# Patient Record
Sex: FEMALE | Race: WHITE
[De-identification: names, ages, dates, MRNs, and addresses within clinical notes are randomized per-mention and may not be internally consistent; named-entity substitution may affect disease eponyms.]

---

## 2018-06-24 ENCOUNTER — HOSPITAL ENCOUNTER (INPATIENT)
Dept: HOSPITAL 68 - ERH | Age: 78
LOS: 3 days | DRG: 394 | End: 2018-06-27
Attending: INTERNAL MEDICINE | Admitting: INTERNAL MEDICINE
Payer: COMMERCIAL

## 2018-06-24 VITALS — DIASTOLIC BLOOD PRESSURE: 58 MMHG | SYSTOLIC BLOOD PRESSURE: 100 MMHG

## 2018-06-24 VITALS — SYSTOLIC BLOOD PRESSURE: 100 MMHG | DIASTOLIC BLOOD PRESSURE: 58 MMHG

## 2018-06-24 VITALS — BODY MASS INDEX: 24.83 KG/M2 | WEIGHT: 140.12 LBS | HEIGHT: 63 IN

## 2018-06-24 DIAGNOSIS — E87.6: ICD-10-CM

## 2018-06-24 DIAGNOSIS — R91.8: ICD-10-CM

## 2018-06-24 DIAGNOSIS — Z66: ICD-10-CM

## 2018-06-24 DIAGNOSIS — F32.9: ICD-10-CM

## 2018-06-24 DIAGNOSIS — D72.1: ICD-10-CM

## 2018-06-24 DIAGNOSIS — E86.0: ICD-10-CM

## 2018-06-24 DIAGNOSIS — E87.2: ICD-10-CM

## 2018-06-24 DIAGNOSIS — K55.039: Primary | ICD-10-CM

## 2018-06-24 DIAGNOSIS — E78.5: ICD-10-CM

## 2018-06-24 DIAGNOSIS — D72.829: ICD-10-CM

## 2018-06-24 DIAGNOSIS — R19.7: ICD-10-CM

## 2018-06-24 LAB
ABSOLUTE GRANULOCYTE CT: 8.2 /CUMM (ref 1.4–6.5)
BASOPHILS # BLD: 0 /CUMM (ref 0–0.2)
BASOPHILS NFR BLD: 0.1 % (ref 0–2)
EOSINOPHIL # BLD: 0.9 /CUMM (ref 0–0.7)
EOSINOPHIL NFR BLD: 7.9 % (ref 0–5)
ERYTHROCYTE [DISTWIDTH] IN BLOOD BY AUTOMATED COUNT: 14 % (ref 11.5–14.5)
GRANULOCYTES NFR BLD: 73 % (ref 42.2–75.2)
HCT VFR BLD CALC: 40.6 % (ref 37–47)
LYMPHOCYTES # BLD: 1.6 /CUMM (ref 1.2–3.4)
MCH RBC QN AUTO: 29.3 PG (ref 27–31)
MCHC RBC AUTO-ENTMCNC: 33 G/DL (ref 33–37)
MCV RBC AUTO: 88.9 FL (ref 81–99)
MONOCYTES # BLD: 0.5 /CUMM (ref 0.1–0.6)
PLATELET # BLD: 632 /CUMM (ref 130–400)
PMV BLD AUTO: 7.4 FL (ref 7.4–10.4)
RED BLOOD CELL CT: 4.57 /CUMM (ref 4.2–5.4)
WBC # BLD AUTO: 11.2 /CUMM (ref 4.8–10.8)

## 2018-06-24 NOTE — HISTORY & PHYSICAL
Eber Maria MD 06/24/18 1700:
General Information and HPI
MD Statement:
I have seen and personally examined DI SMITH and documented this H&P.
 
The patient is a 77 year old F who presented with a patient stated chief 
complaint of nausea, vomiting, diarrhea, and abdominal pain.
 
Source of Information: patient, family
Exam Limitations: no limitations
History of Present Illness:
77 year old female with HLD, PUD s/p EGD tx'd with PPI, doesn't recall being 
treated with antibiotics for Hpylori, depression, former 30pack-year smoker quit
40 years ago, and a remote history of colitis (uncertain tx), with last 
colonoscopy and EGD 7 years ago, reportedly no polyps, evaluated by Dr. Yolis Schwab in [place] presented with complaints of abdominal pain, nausea, 
vomiting, and diarrhea.  The patient reports that she was in her usual state of 
health approximately three weeks ago and went to her annual visit with her PCP. 
She was found to be guiaic positive on DESIREE and had labs performed that were 
unremarkable.  Approximately one week later, she developed crampy lower 
abdominal pain and associated diarrhea (4-5 episodes per day).  She didn't have 
any fevers, chills, sick contacts, recent travel, or suspiscious ingestions.  
The patient was re-evaluated by her PCP after about a week and had more labs 
performed notable for leukocytosis.  She continued to have diarrhea, watery with
some blood notable on the toilet paper after wiping but not in the toilet or 
stool.  The patient reportedly normally suffers from constipation that she 
manages with dulcolax.  Her primary care then scheduled her to have CT scan in 
three days from now and prescribed cipro/flagyl.  She continued to worsen with 
persistent abdominal pain, stabbing and crampy 10/10, somewhat improved after 
bowel movements and vomiting.  She has had poor oral intake for the past several
days that has been worsening.  She started out with nausea and dry heaves, but 
now is wretching and unable to tolerate PO including her antibiotics, of which 
she only took one days worth.  On review of systems, she reports constipation, 
occasional headache managed with tylenol, and fatigue.  She denies any skin 
rashes, joint pains, weight loss, chest pain, dyspnea, presyncopal symptoms, or 
recent antibiotic treatment.  On presentation to the ED, she had a HR in the 90s
, mild leukocytosis, lactic acidemia and CT findings of colitis.  She was 
treated with normal saline, ceftriaxone, flagyl, zofran, and morphine, which 
improved her pain.  She is being admitted to general medicine for management of 
colitis with inability to tolerate oral intake including antibiotics.
 
Allergies/Medications
Allergies:
Coded Allergies:
No Known Allergies (06/24/18)
 
Compliance With Home Meds: GOOD
 
Past History
 
Travel History
Traveled to [place] past 21 day No
 
Medical History
Neurological: NONE
EENT: NONE
Cardiovascular: LOW BLOOD PRESSURE
Respiratory: NONE
Gastrointestinal: DIVERTICULITIS
Hepatic: NONE
Renal: NONE
Musculoskeletal: NONE
Psychiatric: NONE
Endocrine: NONE
 
Surgical History
Surgical History: appendectomy
 
Past Family/Social History
 
Family History
Relations & Conditions if any
MOTHER
  FH: ovarian cancer
SISTER
  FH: lung cancer
SISTER
  FH: breast cancer
aunt
  FH: pancreatic cancer
 
 
Psychosocial History
Smoking Status: Former Smoker
ETOH Use: denies use
Illicit Drug Use: denies illicit drug use
 
Functional Ability
ADLs
Independent: dressing, eating, toileting, bathing. 
Ambulation: independent
IADLs
Independent: shopping, housework, finances, food prep, telephone, transportation
, medication admin. 
 
Review of Systems
 
Review of Systems
Constitutional:
Reports: chills, malaise.  Denies: fever. 
EENTM:
Reports: no symptoms. 
Cardiovascular:
Denies: chest pain. 
Respiratory:
Denies: cough, short of breath. 
GI:
Reports: abdominal pain, bloating, constipation, diarrhea, nausea, bloody stool,
changes in stool, vomiting. 
Genitourinary:
Denies: discharge, dysuria. 
Musculoskeletal:
Denies: joint pain. 
Skin:
Denies: rash. 
Neurological/Psychological:
Reports: no symptoms. 
Hematologic/Endocrine:
Reports: no symptoms. 
Immunologic/Allergic:
Reports: no symptoms. 
All Other Systems: Reviewed and Negative
 
Exam & Diagnostic Data
Last 24 Hrs of Vital Signs/I&O
 Vital Signs
 
 
Date Time Temp Pulse Resp B/P B/P Pulse O2 O2 Flow FiO2
 
     Mean Ox Delivery Rate 
 
06/24 1446 97.2 96 16 108/70  98 Room Air  
 
06/24 1413      96 Room Air  
 
06/24 1347 97.3 98 16 115/64  98 Room Air  
 
 
 Intake & Output
 
 
 06/24 1600 06/24 0800 06/24 0000
 
Intake Total 0  
 
Output Total   
 
Balance 0  
 
    
 
Intake, Oral 0  
 
Patient 63.957 kg  
 
Weight   
 
 
 
 
Physical Exam
General Appearance Alert, Oriented X3, Cooperative, No Acute Distress
Skin No Rashes, No Breakdown
Skin Temp/Moisture Exam: Warm/Dry
Sepsis Skin Exam (color): Normal for Ethnicity
Cardiovascular Regular Rate, Normal S1, Normal S2, No Murmurs
Lungs Clear to Auscultation, Normal Air Movement
Abdomen Normal Bowel Sounds, Soft, mild distention, tenderness to palpation 
epigastric and RLQ, no guarding, no rebound, no rigidity
Extremities No Clubbing, No Cyanosis, No Edema, Normal Pulses
Sepsis Peripheral Pulse Location: Radial
Sepsis Peripheral Pulse Exam: Normal
Sepsis Cap Refill Exam: <2 Sec
Last 24 Hrs of Labs/John:
 Laboratory Tests
 
06/24/18 1715:
Lactic Acid 1.1
 
06/24/18 1430:
Anion Gap 12, Estimated GFR > 60, BUN/Creatinine Ratio 20.0, Glucose 144  H, 
Lactic Acid 2.8  H, Calcium 8.8, Iron Pending, TIBC Pending, Ferritin Pending, 
Total Bilirubin 0.5, AST 32, ALT 38, Alkaline Phosphatase 118, Troponin I < 0.01
, C-Reactive Prot, Quant Pending, Total Protein 5.8  L, Albumin 2.8  L, Globulin
3.0, Albumin/Globulin Ratio 0.9  L, Amylase 31, Lipase 23, CBC w Diff NO MAN 
DIFF REQ, RBC 4.57, MCV 88.9, MCH 29.3, MCHC 33.0, RDW 14.0, MPV 7.4, Gran % 
73.0, Lymphocytes % 14.5  L, Monocytes % 4.5, Eosinophils % 7.9  H, Basophils % 
0.1, Absolute Granulocytes 8.2  H, Absolute Lymphocytes 1.6, Absolute Monocytes 
0.5, Absolute Eosinophils 0.9, Absolute Basophils 0
 Microbiology
06/24 1810  STOOL: Stool Culture - ORD
 
 
 
Diagnostic Data
EKG Results
sinus rhythm without ischemic changes
Other Results
CT Abdomen/Pelvis with IV contrast
 
IMPRESSION:
- Diffuse colonic wall thickening with pericolonic inflammatory stranding and
small amount of ascites. The findings are concerning for
infectious/laboratory colitis. There is liquid stool within the distal colon.
No evidence of perforation.
- A nonspecific 2.7 cm lesion seen in the region of the gastric on this and
may represent a gastric diverticulum or alternatively could be related to the
adrenal gland. Attention to this lesion is recommended at the time of
follow-up imaging.
- Multiple pulmonary nodules at the lung bases the largest on the left
measuring up to 9 mm. Metastatic disease cannot be excluded.
- Fibroid uterus and left adnexal cyst.
 
Assessment/Plan
Assessment:
77 year old female with HLD, PUD s/p EGD tx'd with PPI, doesn't recall being 
treated with antibiotics for Hpylori, depression, former 30pack-year smoker quit
40 years ago, and a remote history of colitis (uncertain tx), with last 
colonoscopy and EGD 7 years ago, reportedly no polyps, evaluated by Dr. Yolis Schwab in [place] presented with complaints of abdominal pain, nausea, 
vomiting, and diarrhea.
 
Colitis:
 Most likely infectious colitis with acute onset, possibly ischemic with h/o HLD
and smoking, although blood appears more related to constipation and hemorrhoids
than than bloody diarrhea of ischemic colitis or ulcerative colitis, also 
unclear history of colitis in the past, 
 Check stool studies, culture, ova and parasites, C diff.
 Gastroenterology consultation
 Continue ceftriaxone and metronidazole intravenously
 Will need follow up colonoscopy
 Mild leukocytosis to 11.2 and thrombocytosis to 632, likely reactive, repeat 
CBC in AM
 Amylase, lipase, and liver function tests all normal
 Lactic acid elevated, likely secondary to diarrhea, poor PO, dehydration
 Trend after intravascular volume resuscitation
 Continue NS @ 75cc/hr x 2 bags
 Clear liquid diet for now will plan to advance as condition improves
 Obtain records from Dr. Yolis Basurto
 
CT
Diffuse thickening involving the distal transverse colon, descending colon, and 
sigmoid colon with areas of pericolonic inflammatory stranding. A small amount 
of ascites is seen for example in the right paracolic gutter and layering 
dependently within the pelvis. There is liquid stool within the sigmoid colon. 
The stomach appears normal. There is a possible gastric fundus diverticulum 
versus adrenal lesion which measures 2.7 cm (series 2 image 22). The appendix is
not well seen.
Numerous noncalcified pulmonary nodules, largest up to 9mm
Will need follow up for pulmonary nodules on CT
 
HLD:
 Continue pravastatin
 
Depression:
 Continue paxil
 
PO PPI, continue vitamin supplementation
Clear liquid diet
DVT ppx-heparin sc and ALPs
DNR/DNI
 
As Ranked By This Provider
Problem List:
 1. Bloody stool
 
 2. Gastric lesion
 
 3. Infectious colitis
 
 
Core Measures/Misc (9/17)
 
Acute Coronary Syndrome
ACS Diagnosis: No
 
Congestive Heart Failure
Congestive Heart Failure Diagnosis No
 
Cerebrovascular Accident
CVA/TIA Diagnosis: No
 
VTE (View Protocol)
VTE Risk Factors Age>40
No Mechanical VTE Prophylaxis d/t N/A MechProphylax Ordered
No VTE Pharm Prophylaxis d/t NA PharmProphylax ordered
 
Sepsis (View protocol)
Sepsis Present: No
If YES complete Sepsis Event Note If YES complete Sepsis Event Note
 
Tari Jones MD 06/24/18 1710:
General Information and HPI
 
Allergies/Medications
Home Med list
Cholecalciferol (Vitamin D3) (Vitamin D) 1,000 UNIT TABLET   1 TAB PO DAILY 
SUPPLEMENT  (Reported)
Ciprofloxacin HCl 500 MG TABLET   1 TAB PO BID ABX  (Reported)
Cyanocobalamin (Vitamin B-12) 1,000 MCG TABLET   1 TAB PO EOD SUPPLEMENT  (
Reported)
Metronidazole 500 MG TABLET   1 TAB PO BID ABX  (Reported)
Omeprazole Magnesium (Prilosec Otc) 20 MG TABLET.DR   1 TAB PO DAILY peptic 
ulcer disease  (Reported)
Paroxetine HCl 20 MG TABLET   1 TAB PO DAILY MENTAL HEALTH  (Reported)
Pravastatin Sodium 20 MG TABLET   1 TAB PO DAILY CHOLESTEROL  (Reported)
 
 
 
Core Measures/Misc (9/17)
 
Sepsis (View protocol)
If YES complete Sepsis Event Note If YES complete Sepsis Event Note
 
Resident Review Statement
Resident Statement: examined this patient, discussed with intern, agreed with 
intern, discussed with family, reviewed EMR data (avail), discussed with nursing
, discussed with case mgmt, reviewed images, amended to note
Other Findings:
Patient is a 78 YO F with PMH significant for depression, PUD, HLD, remote 
colitis history with bloody diarrhea, normal colonoscopy 7yrs ago presented to 
[place] with progressive worsening of diarrhea/nausea dry heaving for the past 
few days. Patient was initially evaluated with GI, recommended to take 
antibiotics after blood work for clinical suspicion of diverticulitis. Patient 
started taking antibiotics since yesterday. She did have 3-4 episodes of 
diarrhea preceeded by tenesmus, mixed with blood. Today unable to tolerate oral 
intake along with dry heaving. Family history notable multiple carcinomas 
including lung, breast. Smoked for 30pack yrs, quit 40yrs ago.
VS at presentation are unremarkable, afebrile, /64mmHg, on RA. Physical 
exam alert, oriented, heart and lungs are clear. Abdomen did show RLQ and LLQ 
pain on deep palpation, mild epigastric discomfort and soreness with palpation. 
No edema. Labs did show white count of 11.2, Platelet count of 632, chem panel 
unremakable. Lactic acid 2.8. CT abdomen and pelvis - Diffuse colonic wall 
thickneing with pericolonic inflammatory stranding adn small amount of ascites. 
2.7cm lesion. Multiple pulmonary nodules. 
 
 
Plan
 
Admit to general medicine floor
 
Colitis - infectious vs inflammatory
H/O colitis, normal recent colonoscopy
* Unable to toelrate PO
* IV ceftriaxone and flagyl emperically
* monitor vitals, check orthostatics
* NPO and bowel rest
* IV fluids with D51/2NS @ 100ml/hr
* Stool for ova, parasites, leukoytes, C.diff
* Hold prilosec
* Trend lactic acid, check CRP/ESR
* GI consult - for anticipated colonoscopy after this episode
 
Multiple nodules in the lung
On CT scan there are numerous noncalcified nodules involving the bilateral lower
lungs largest in the left lower lobe measuring up to 9 mm. History of 30 pack yr
smoking, quit 40yrs ago. Family history of lung cancer (primary vs metastatic).
* Needs follow up PET scan/removal
* Atleast follow up in next 3 months with repeat CT
 
Depression: Continue paroxetine
HLD: continue pravastatin
 
DVT prophylaxis
SC heparin
 
code status
Full code
 
 
Kathrine Martinez 06/25/18 0217:
Core Measures/Misc (9/17)
 
Sepsis (View protocol)
If YES complete Sepsis Event Note If YES complete Sepsis Event Note
 
Attending MD Review Statement
 
Attending Statement
Attending MD Statement: examined this patient, discuss w/resident/PA/NP, agreed 
w/resident/PA/NP, reviewed EMR data (avail), reviewed images, amended to note
Attending Assessment/Plan:
 
CC: Abdominal pain and diarrhea
PMH: HLD, depression, PUD
 
Patient came to ER for persistent abdominal pain diarrhea since last 2 weeks and
nausea vomiting since last 2 days. Patient states that she started to notice 
abdominal pain since last 2-3 weeks, gradually worsening, more so in the right 
lower quadrant and left lower quadrant, crampy in nature, worse immediately 
before bowel movement, relieved a little bit with bowel movement, nonradiating. 
She has been having diarrhea at least 4-5 bowel movements per day, watery, blood
on wipes. Denies any fever, chills, sick contacts, recent travels, change in 
food. She followed up with primary care physician 3 days back and was started on
antibiotics with suspicion of diverticulitis. She took a couple of doses of 
Cipro and Flagyl and felt extremely nauseous and started vomiting, could not 
tolerate by mouth intake. Because of worsening nausea vomiting and persistent 
abdominal pain she came to ER. Patient had a remote history of colitis 
approximately 30-40 years in the past.
 
Vitals: Temperature 97.3, pulse 98, RR 16, blood pressure 115/64, saturating 98%
on room air.
On exam: A O 3, cooperative, no acute distress, neck supple, JVD normal, no 
lymphadenopathy, mucosa dry, no focal neurological deficit, no dependent edema, 
no obvious skin rashes or inflammation CVS: S1-S2, RRR. RS: Clear to auscultate 
bilaterally. Abdomen: Soft, mild tenderness in right and left lower quadrant, no
guarding or rigidity, no rebound, ND, bowel sounds present.
 
CT abdomen pelvis with IV contrast:
- Diffuse colonic wall thickening with pericolonic inflammatory stranding and
small amount of ascites. The findings are concerning for
infectious/laboratory colitis. There is liquid stool within the distal colon.
No evidence of perforation.
- A nonspecific 2.7 cm lesion seen in the region of the gastric on this and
may represent a gastric diverticulum or alternatively could be related to the
adrenal gland. Attention to this lesion is recommended at the time of
follow-up imaging.
- Multiple pulmonary nodules at the lung bases the largest on the left
measuring up to 9 mm. Metastatic disease cannot be excluded.
- Fibroid uterus and left adnexal cyst.
 
Assessment and plan
 
77 year old female presented in ER for persistent crampy abdominal pain 
associated with diarrhea since last 2-3 weeks and 2 days history of nausea 
vomiting after starting antibiotics for suspected diverticulitis. She went to 
see primary care physician followed by gastroenterologist approximately 3 days 
back, some labs were obtained and she was suspected to have diverticulitis and 
was started on ciprofloxacin and Flagyl. No CT scan was obtained at that time. I
think nausea vomiting is most likely secondary to recent antibiotic use. 
Infectious gastroenteritis is another possibility. CT scan confirms the finding 
of diffuse colitis, likely infectious in origin. Inflammatory or ischemic 
reasons should be ruled out. Patient had mild lactic acidosis and leukocytosis 
along with thrombocytosis. 
 
+ Colitis
+ Dehydration
+ Lactic acidosis
+ Pulmonary nodule: Discussed finding with patient's needs outpatient follow-up
+ History of HLD, depression, PUD
 
- Admit to general medicine
- Continue gentle hydration
- Stool culture, stool ova and parasite, C. difficile
- ESR CRP
- Orthostatic vitals
- Trend lactate
- GI consult
- Continue IV ceftriaxone and Flagyl
- Adequate pain control

## 2018-06-24 NOTE — ED GI/GU/ABDOMINAL COMPLAINT
History of Present Illness
 
General
Chief Complaint: Nausea, Vomiting, Diarrhea
Stated Complaint: DIVERTICULITIS/VOMITING
Source: patient, family
Exam Limitations: no limitations
 
Vital Signs & Intake/Output
Vital Signs & Intake/Output
 Vital Signs
 
 
Date Time Temp Pulse Resp B/P B/P Pulse O2 O2 Flow FiO2
 
     Mean Ox Delivery Rate 
 
 1446 97.2 96 16 108/70  98 Room Air  
 
 1413      96 Room Air  
 
 1347 97.3 98 16 115/64  98 Room Air  
 
 
 
Allergies
Coded Allergies:
No Known Allergies (18)
 
Triage Note:
PER PT "TOO SICK TO TAKE MEDICATION PRESCRIBED FOR
 DIARRHEA AND VOMITING DX WITH DIVERTIC
 ON CIPO
 HAVING VOMITING AND DIARRHEA
Triage Nurses Notes Reviewed? yes
Pregnant? N
Is pt currently breastfeeding? No
Duration: getting worse
Timing: recent history
Severity Numbers: 7
Location: generalized abdomen
Radiation: no radiation
HPI:
Patient is a 77-year-old female with past medical history of colitis and 
diverticulitis, hyperlipidemia and mood disorder who presents emergency room 
with concerns of generalized abdominal pain persistent nausea vomiting diarrhea 
for the past 5 days, patient followed up with her gastroenterologist  
2 days ago and prescribed patient ciprofloxacin and Flagyl with no improvement 
of her symptoms.  Patient's complaining of a term in bloody stools.  Patient is 
intolerant of by mouth intake positive for fevers and chills 
denies any chest pain arm pain jaw pain shortness of breath dysuria hematuria.
(Beny Rios)
 
Past History
 
Travel History
Traveled to [place] past 21 day No
 
Medical History
Any Pertinent Medical History? see below for history
Neurological: NONE
EENT: NONE
Cardiovascular: LOW BLOOD PRESSURE
Respiratory: NONE
Gastrointestinal: DIVERTICULITIS
Hepatic: NONE
Renal: NONE
Musculoskeletal: NONE
Psychiatric: NONE
Endocrine: NONE
 
Surgical History
Surgical History: appendectomy
 
Psychosocial History
What is your primary language English
Tobacco Use: Never used
 
Family History
Hx Contributory? No
(Beny Rios)
 
Review of Systems
 
Review of Systems
Constitutional:
Reports: see HPI. 
EENTM:
Reports: no symptoms. 
Respiratory:
Reports: no symptoms. 
Cardiovascular:
Reports: no symptoms. 
GI:
Reports: see HPI, abdominal pain. 
Genitourinary:
Reports: no symptoms. 
Musculoskeletal:
Reports: no symptoms. 
Skin:
Reports: no symptoms. 
Neurological/Psychological:
Reports: no symptoms. 
Hematologic/Endocrine:
Reports: see HPI, bleeding. 
Immunologic/Allergic:
Reports: no symptoms. 
All Other Systems: Reviewed and Negative
(Beny Rios)
 
Physical Exam
 
Physical Exam
General Appearance: mild distress
Head: atraumatic
Eyes:
Bilateral: normal appearance. 
Ears, Nose, Throat, Mouth: moist mucous membrane
Neck: normal inspection
Respiratory: no respiratory distress
Cardiovascular: regular rate/rhythm
Gastrointestinal: soft, tenderness
Rectal: bloody stool, NO ACTIVE BLEEDING FROM RECTUM MILD STREAKS ON BLOOD AFTER
DESIREE
Extremities: normal range of motion
Neurologic/Psych: no motor/sensory deficits, awake
Skin: intact, normal color, warm/dry
 
Core Measures
ACS in differential dx? No
Sepsis Present: No
Sepsis Focused Exam Completed? No
(Beny Rios)
 
Progress
Differential Diagnosis: AAA, AMI, appendicitis, biliary colic, bowel obstruction
, colon cancer, cholecystitis, diverticulitis, endometritis, esophageal varices,
gastritis, hepatitis, hernia, hemorrhoids, ischemic bowel, inflamm bowel dis, 
kidney stone, ovarian cyst, ovarian torsion, pancreatitis, PID/cervicitis, 
peptic ulcer, PUD/GERD, perforated viscous, SBO, UTI/pyelo
Plan of Care:
 Orders
 
 
Procedure Date/time Status
 
Nothing by Mouth  D Active
 
Pathway - chart  1717 Active
 
House Staff  1717 Active
 
Patient Data  1717 Active
 
URINALYSIS  1717 Active
 
Code Status  1717 Active
 
Patient Data  1656 Active
 
LACTIC ACID  1648 Active
 
OXYGEN SETUP (GEN)  1640 Active
 
Saline Lock  1640 Active
 
Admit to inpatient  1640 Active
 
Vital Signs  1640 Active
 
Activity/Ambulation  1640 Active
 
Code Status  1640 Complete
 
EKG  1449 Active
 
Intake & Output  1413 Active
 
TROPONIN LEVEL  1348 Complete
 
LIPASE  1348 Complete
 
LACTIC ACID  1348 Complete
 
COMPREHENSIVE METABOLIC PANEL  1348 Complete
 
CBC WITHOUT DIFFERENTIAL  1348 Complete
 
AMYLASE  1348 Complete
 
VTE Mechanical Prophylaxis   UNK Active
 
Vital Signs   UNK Active
 
Intake & Output   UNK Active
 
Activity/Ambulation   UNK Active
 
 
 Current Medications
 
 
  Sig/Maria E Start time  Last
 
Medication Dose  Stop Time Status Admin
 
Dextrose/Sodium  1,000 ML Q20H  1730 UNVr 
 
Chloride     
 
(D5W-1/2 Normal      
 
Saline 1000ML)     
 
Ceftriaxone Sodium 1,000 MG ONCE ONE  1645 UNVr 
 
(Rocephin)    1646  1718
 
Metronidazole 500 MG ONCE ONE  164 UNVr 
 
(Flagyl)    1744  1718
 
N/A 1 UNIT    
 
(No Carrier)     
 
 
 Laboratory Tests
 
 
 
18 1715:
Lactic Acid Pending
 
18 1430:
Anion Gap 12, Estimated GFR > 60, BUN/Creatinine Ratio 20.0, Glucose 144  H, 
Lactic Acid 2.8  H, Calcium 8.8, Total Bilirubin 0.5, AST 32, ALT 38, Alkaline 
Phosphatase 118, Troponin I < 0.01, Total Protein 5.8  L, Albumin 2.8  L, 
Globulin 3.0, Albumin/Globulin Ratio 0.9  L, Amylase 31, Lipase 23, CBC w Diff 
NO MAN DIFF REQ, RBC 4.57, MCV 88.9, MCH 29.3, MCHC 33.0, RDW 14.0, MPV 7.4, 
Gran % 73.0, Lymphocytes % 14.5  L, Monocytes % 4.5, Eosinophils % 7.9  H, 
Basophils % 0.1, Absolute Granulocytes 8.2  H, Absolute Lymphocytes 1.6, 
Absolute Monocytes 0.5, Absolute Eosinophils 0.9, Absolute Basophils 0
After initial examination patient was in mild distress she was given pain 
medications and nausea medications which she had complete resolution of her 
symptoms however patient has failed outpatient treatment with by mouth 
antibiotics patient has mild elevation of leukocytosis and lactic acid and shows
CT scan findings of infectious colitis.
 
Discussed admission with patient and family members who agree
 
Upon admission no concerns of severe sepsis or septic shock
Diagnostic Imaging:
Viewed by Me: CT Scan. 
Radiology Impression: acute abnormality
Initial ED EKG: normal p-waves, normal QRS complex, normal sinus rhythm, 76 bpm,
low voltage
Comments:
PATIENT: DI SMITH  MEDICAL RECORD NO: [id number]
PRESENT AGE: 77  PATIENT ACCOUNT NO: [id number]
: 38  LOCATION: [facility]
ORDERING PHYSICIAN: Beny VALDEZ  
 
  SERVICE DATE: 
EXAM TYPE: CAT - CT ABD & PELVIS W IV CONTRAST
 
EXAMINATION:
CT ABDOMEN AND PELVIS WITH CONTRAST
 
CLINICAL INFORMATION:
Diverticulitis on Cipro. Abdominal pain and vomiting.
 
COMPARISON:
None available.
 
TECHNIQUE:
Multidetector volumetric imaging was performed of the abdomen and pelvis
following IV administration of 95 mL of Optiray 320 intravenous contrast.
Sagittal and coronal reformatted images were obtained on the technologist's
workstation.
 
DLP:
277 mGy-cm
 
FINDINGS:
 
LUNG BASES: There are numerous noncalcified nodules involving the bilateral
lower lungs largest in the left lower lobe measuring up to 9 mm. Metastatic
disease cannot be excluded.
 
LIVER, GALLBLADDER, AND BILIARY TREE: The liver is normal in size, shape, and
attenuation. No focal hepatic lesion or biliary ductal dilatation is present.
The gallbladder is unremarkable with no evidence of radiopaque gallstones,
gallbladder wall thickening, or obvious pericholecystic inflammatory changes.
 
 
PANCREAS: Unremarkable.
 
SPLEEN: Unremarkable.
 
ADRENAL GLANDS: Unremarkable.
 
KIDNEYS AND URETERS: The kidneys are normal in size, shape, and attenuation.
No hydronephrosis, hydroureter, or calculi seen. No perinephric stranding.
 
BLADDER: Unremarkable.
 
GASTROINTESTINAL TRACT: There is diffuse thickening involving the distal
transverse colon, descending colon, and sigmoid colon with areas of
pericolonic inflammatory stranding. A small amount of ascites is seen for
example in the right paracolic gutter and layering dependently within the
pelvis. There is liquid stool within the sigmoid colon. The stomach appears
normal. There is a possible gastric fundus diverticulum versus adrenal lesion
which measures 2.7 cm (series 2 image 22). The appendix is not well seen.
 
ABDOMINAL WALL: No significant hernia is appreciated.
 
LYMPH NODES: Normal.
 
VASCULAR: Atheromatous changes within the abdominal aorta and its branch
vessels.
 
PELVIC VISCERA: A 1.5 cm fibroid is noted within the uterus. There is a left
adnexal cyst measuring 3 cm. The right adnexa appears normal.
 
OSSEOUS STRUCTURES: Multilevel degenerative changes in the lumbar spine. No
destructive osseous.
 
IMPRESSION:
- Diffuse colonic wall thickening with pericolonic inflammatory stranding and
small amount of ascites. The findings are concerning for
infectious/laboratory colitis. There is liquid stool within the distal colon.
No evidence of perforation.
- A nonspecific 2.7 cm lesion seen in the region of the gastric on this and
may represent a gastric diverticulum or alternatively could be related to the
adrenal gland. Attention to this lesion is recommended at the time of
follow-up imaging.
- Multiple pulmonary nodules at the lung bases the largest on the left
measuring up to 9 mm. Metastatic disease cannot be excluded.
- Fibroid uterus and left adnexal cyst.
 
DICTATED BY: Delisa Phillips MD 
DATE/TIME DICTATED:18
:RAD.DEE 
DATE/TIME TRANSCRIBED:18
 
CONFIDENTIAL, DO NOT COPY WITHOUT APPROPRIATE AUTHORIZATION.
 
 <Electronically signed in Other Vendor System>                                 
          
                                           SIGNED BY: Delisa Phillips MD 18
 
 
 
(Beny Rios)
 
Departure
 
Departure
Disposition: STILL A PATIENT
Condition: Stable
Clinical Impression
Primary Impression: Infectious colitis
Secondary Impressions: Bloody stool, Gastric lesion
Referrals:
Ambika Mejía (PCP/Family)
 
Departure Forms:
Customer Survey
General Discharge Information
 
Admission Note
Spoke With:
Js Vitale MD
Documentation of Exam:
Documentation of any treatments & extenuating circumstances including Concerns 
Regarding Discharge (functional status, medication knowledge or non-compliance, 
living conditions, etc.) that warrant an admission rather than observation: [
Patient requires IV fluids repeat labs GI consultation IV antibiotics for failed
outpatient treatment of antibiotics for concerns of infectious colitis/
diverticulitis, patient is intolerant of by mouth intake prior to arrival
]
 
(Beny Rios)
 
PA/NP Co-Sign Statement
Statement:
ED Attending supervision documentation-
 
x I saw and evaluated the patient. I have also reviewed all the pertinent lab 
results and diagnostic results. I agree with the findings and the plan of care 
as documented in the PA's/NP's documentation. Abdominal pain with colitis on CT 
scan, elevated lactic acid / leukocystosis
 
[] I have reviewed the ED Record and agree with the PA's/NP's documentation.
 
[] Additions or exceptions (if any) to the PAs/NP's note and plan are 
summarized below:
[]
 
(Shannan BARRIOS,John)

## 2018-06-24 NOTE — CT SCAN REPORT
EXAMINATION:
CT ABDOMEN AND PELVIS WITH CONTRAST
 
CLINICAL INFORMATION:
Diverticulitis on Cipro. Abdominal pain and vomiting.
 
COMPARISON:
None available.
 
TECHNIQUE:
Multidetector volumetric imaging was performed of the abdomen and pelvis
following IV administration of 95 mL of Optiray 320 intravenous contrast.
Sagittal and coronal reformatted images were obtained on the technologist's
workstation.
 
DLP:
277 mGy-cm
 
FINDINGS:
 
LUNG BASES: There are numerous noncalcified nodules involving the bilateral
lower lungs largest in the left lower lobe measuring up to 9 mm. Metastatic
disease cannot be excluded.
 
LIVER, GALLBLADDER, AND BILIARY TREE: The liver is normal in size, shape, and
attenuation. No focal hepatic lesion or biliary ductal dilatation is present.
The gallbladder is unremarkable with no evidence of radiopaque gallstones,
gallbladder wall thickening, or obvious pericholecystic inflammatory changes.
 
 
PANCREAS: Unremarkable.
 
SPLEEN: Unremarkable.
 
ADRENAL GLANDS: Unremarkable.
 
KIDNEYS AND URETERS: The kidneys are normal in size, shape, and attenuation.
No hydronephrosis, hydroureter, or calculi seen. No perinephric stranding.
 
BLADDER: Unremarkable.
 
GASTROINTESTINAL TRACT: There is diffuse thickening involving the distal
transverse colon, descending colon, and sigmoid colon with areas of
pericolonic inflammatory stranding. A small amount of ascites is seen for
example in the right paracolic gutter and layering dependently within the
pelvis. There is liquid stool within the sigmoid colon. The stomach appears
normal. There is a possible gastric fundus diverticulum versus adrenal lesion
which measures 2.7 cm (series 2 image 22). The appendix is not well seen.
 
ABDOMINAL WALL: No significant hernia is appreciated.
 
LYMPH NODES: Normal.
 
VASCULAR: Atheromatous changes within the abdominal aorta and its branch
vessels.
 
PELVIC VISCERA: A 1.5 cm fibroid is noted within the uterus. There is a left
adnexal cyst measuring 3 cm. The right adnexa appears normal.
 
OSSEOUS STRUCTURES: Multilevel degenerative changes in the lumbar spine. No
destructive osseous.
 
IMPRESSION:
- Diffuse colonic wall thickening with pericolonic inflammatory stranding and
small amount of ascites. The findings are concerning for
infectious/laboratory colitis. There is liquid stool within the distal colon.
No evidence of perforation.
- A nonspecific 2.7 cm lesion seen in the region of the gastric on this and
may represent a gastric diverticulum or alternatively could be related to the
adrenal gland. Attention to this lesion is recommended at the time of
follow-up imaging.
- Multiple pulmonary nodules at the lung bases the largest on the left
measuring up to 9 mm. Metastatic disease cannot be excluded.
- Fibroid uterus and left adnexal cyst.

## 2018-06-25 VITALS — SYSTOLIC BLOOD PRESSURE: 118 MMHG | DIASTOLIC BLOOD PRESSURE: 70 MMHG

## 2018-06-25 VITALS — DIASTOLIC BLOOD PRESSURE: 56 MMHG | SYSTOLIC BLOOD PRESSURE: 96 MMHG

## 2018-06-25 VITALS — DIASTOLIC BLOOD PRESSURE: 60 MMHG | SYSTOLIC BLOOD PRESSURE: 120 MMHG

## 2018-06-25 LAB
ABSOLUTE GRANULOCYTE CT: 8.4 /CUMM (ref 1.4–6.5)
ABSOLUTE GRANULOCYTE CT: 8.4 /CUMM (ref 1.4–6.5)
BASOPHILS # BLD: 0 /CUMM (ref 0–0.2)
BASOPHILS # BLD: 0.1 /CUMM (ref 0–0.2)
BASOPHILS NFR BLD: 0.1 % (ref 0–2)
BASOPHILS NFR BLD: 0.5 % (ref 0–2)
EOSINOPHIL # BLD: 2.6 /CUMM (ref 0–0.7)
EOSINOPHIL # BLD: 2.6 /CUMM (ref 0–0.7)
EOSINOPHIL NFR BLD: 18.5 % (ref 0–5)
EOSINOPHIL NFR BLD: 18.9 % (ref 0–5)
ERYTHROCYTE [DISTWIDTH] IN BLOOD BY AUTOMATED COUNT: 13.7 % (ref 11.5–14.5)
ERYTHROCYTE [DISTWIDTH] IN BLOOD BY AUTOMATED COUNT: 14.1 % (ref 11.5–14.5)
GRANULOCYTES NFR BLD: 59.5 % (ref 42.2–75.2)
GRANULOCYTES NFR BLD: 61.5 % (ref 42.2–75.2)
HCT VFR BLD CALC: 34 % (ref 37–47)
HCT VFR BLD CALC: 35 % (ref 37–47)
LYMPHOCYTES # BLD: 1.9 /CUMM (ref 1.2–3.4)
LYMPHOCYTES # BLD: 2.1 /CUMM (ref 1.2–3.4)
MCH RBC QN AUTO: 29.5 PG (ref 27–31)
MCH RBC QN AUTO: 30 PG (ref 27–31)
MCHC RBC AUTO-ENTMCNC: 33 G/DL (ref 33–37)
MCHC RBC AUTO-ENTMCNC: 33.3 G/DL (ref 33–37)
MCV RBC AUTO: 89.3 FL (ref 81–99)
MCV RBC AUTO: 89.9 FL (ref 81–99)
MONOCYTES # BLD: 0.5 /CUMM (ref 0.1–0.6)
MONOCYTES # BLD: 1.2 /CUMM (ref 0.1–0.6)
PLATELET # BLD: 568 /CUMM (ref 130–400)
PLATELET # BLD: 594 /CUMM (ref 130–400)
PMV BLD AUTO: 7.7 FL (ref 7.4–10.4)
PMV BLD AUTO: 7.8 FL (ref 7.4–10.4)
RED BLOOD CELL CT: 3.79 /CUMM (ref 4.2–5.4)
RED BLOOD CELL CT: 3.92 /CUMM (ref 4.2–5.4)
WBC # BLD AUTO: 13.6 /CUMM (ref 4.8–10.8)
WBC # BLD AUTO: 14.1 /CUMM (ref 4.8–10.8)

## 2018-06-25 NOTE — PN- HOUSESTAFF
Candace BARRIOS,Eber 06/25/18 0717:
Subjective
Follow-up For:
colitis
lung nodules
Subjective:
patient had another loose BM, mostly diarrhea
afebrile overnight
complaining of significant lower abdominal pain this morning
afebrile overnight on antibiotics
 
Review of Systems
Constitutional:
Reports: see HPI. 
 
Objective
Last 24 Hrs of Vital Signs/I&O
 Vital Signs
 
 
Date Time Temp Pulse Resp B/P B/P Pulse O2 O2 Flow FiO2
 
     Mean Ox Delivery Rate 
 
06/25 0607 99.2 86 20 96/56  96 Room Air  
 
06/24 2130 98.2 80 20 100/58  96 Room Air  
 
06/24 2120  80  100/58     
 
06/24 1859       Room Air  
 
06/24 1818 98.4 80 16 112/66  98 Room Air  
 
06/24 1446 97.2 96 16 108/70  98 Room Air  
 
06/24 1413      96 Room Air  
 
06/24 1347 97.3 98 16 115/64  98 Room Air  
 
 
 Intake & Output
 
 
 06/25 1600 06/25 0800 06/25 0000
 
Intake Total  800 200
 
Output Total 200 450 
 
Balance -200 350 200
 
    
 
Intake, IV  800 200
 
Intake, Oral  0 0
 
Number 1  
 
Bowel   
 
Movements   
 
Output, Urine 200 450 
 
Patient   59.874 kg
 
Weight   
 
Weight   Bed scale
 
Measurement   
 
Method   
 
 
 
 
Physical Exam
General Appearance: Alert, Oriented X3, Cooperative, No Acute Distress
Cardiovascular: Regular Rate, Normal S1, Normal S2, No Murmurs
Lungs: Clear to Auscultation, Normal Air Movement
Abdomen: Normal Bowel Sounds, Soft, No Masses, RLQ tenderness on deep palpation
Extremities: No Clubbing, No Cyanosis, No Edema, Normal Pulses
Current Medications:
 Current Medications
 
 
  Sig/Maria E Start time  Last
 
Medication Dose Route Stop Time Status Admin
 
Acetaminophen 650 MG Q6P PRN 06/24 1815  06/25
 
  PO   0747
 
Ceftriaxone Sodium 1,000 MG Q24H 06/25 1600 DC 
 
  IV   
 
Ceftriaxone Sodium 1,000 MG Q24H 06/25 0900  06/25
 
  IV   0827
 
Ceftriaxone Sodium 0 .STK-MED ONE 06/24 1707 DC 
 
  .ROUTE   
 
Ceftriaxone Sodium 1,000 MG ONCE ONE 06/24 1645 DC 06/24
 
  IV 06/24 1646  1718
 
Cholecalciferol 1,000 IU DAILY 06/25 0900 AC 06/25
 
  PO   0827
 
Cyanocobalamin 1,000 MCG Q48 06/25 0900 AC 06/25
 
  PO   0827
 
Dextrose/Sodium  1,000 ML Q20H 06/24 1730 AC 06/25
 
Chloride  IV   0606
 
Heparin Sodium  5,000 UNIT Q8 06/24 2200 CAN 
 
(Porcine)  SC   
 
Heparin Sodium  5,000 UNIT Q8 06/24 1724 AC 06/25
 
(Porcine)  SC   0603
 
Metronidazole 500 MG IQ8 06/25 0000 AC 06/25
 
N/A 1 UNIT IV   0827
 
Metronidazole 500 MG ONCE ONE 06/24 1645 DC 06/24
 
N/A 1 UNIT IV 06/24 1744  1718
 
Morphine Sulfate 4 MG Q6P PRN 06/25 1030 AC 06/25
 
  IV   1039
 
Morphine Sulfate 0 .STK-MED ONE 06/24 1436 DC 
 
  .ROUTE   
 
Morphine Sulfate 4 MG ONCE ONE 06/24 1430 DC 06/24
 
  IV 06/24 1431  1441
 
Ondansetron HCl 4 MG Q6P PRN 06/25 0745 AC 06/25
 
  IV   0747
 
Ondansetron HCl 0 .STK-MED ONE 06/24 1436 DC 
 
  .ROUTE   
 
Ondansetron HCl 4 MG ONCE ONE 06/24 1430 DC 06/24
 
  IV 06/24 1431  1441
 
Paroxetine HCl 20 MG DAILY 06/25 0900 AC 06/25
 
  PO   0827
 
Pravastatin Sodium 20 MG 1700 06/25 1700 AC 
 
  PO   
 
Sodium Chloride 1,000 ML BOLUS ONE 06/24 1500 DC 06/24
 
  IV 06/24 1559  1549
 
Sodium Chloride 500 ML BOLUS ONE 06/24 1430 DC 06/24
 
  IV 06/24 1529  1441
 
 
 
 
Last 24 Hrs of Lab/John Results
Last 24 Hrs of Labs/Mics:
 Laboratory Tests
 
06/25/18 0758:
Anion Gap 7, Estimated GFR > 60, BUN/Creatinine Ratio 15.7, Hemoglobin A1c 
Pending, Magnesium 1.7, CBC w Diff MAN DIFF ORDERED, RBC 3.79  L, MCV 89.9, MCH 
30.0, MCHC 33.3, RDW 14.1, MPV 7.8, Gran % 59.5, Lymphocytes % 13.4  L, 
Monocytes % 8.5, Eosinophils % 18.5  H, Basophils % 0.1, Absolute Granulocytes 
8.4  H, Segmented Neutrophils 46, Band Neutrophils 10  H, Absolute Lymphocytes 
1.9, Lymphocytes 16  L, Monocytes 7, Absolute Monocytes 1.2  H, Eosinophils 21  
H, Absolute Eosinophils 2.6, Absolute Basophils 0, Platelet Estimate INCREASED, 
Normochromic RBCs VERIFIED, Basophilic Stippling SLIGHT, Anisocytosis 1+
 
06/25/18 0145:
Urine Color KANE, Urine Clarity CLEAR, Urine pH 6.0, Ur Specific Gravity 1.015,
Urine Protein TRACE  H, Urine Ketones 40  H, Urine Nitrite NEG, Urine Bilirubin 
NEG, Urine Urobilinogen 1.0, Ur Leukocyte Esterase NEG, Ur Microscopic SEDIMENT 
EXAMINED, Urine RBC 3-5, Urine WBC 5-10  H, Ur Epithelial Cells FEW, Urine 
Bacteria MANY  H, Urine Hemoglobin SMALL  H, Urine Glucose NEG
 
06/24/18 2105:
Lactic Acid 0.9
 
06/24/18 1915:
ESR Westergren 30  H
 
06/24/18 1715:
Lactic Acid 1.1
 
06/24/18 1430:
Anion Gap 12, Estimated GFR > 60, BUN/Creatinine Ratio 20.0, Glucose 144  H, 
Lactic Acid 2.8  H, Calcium 8.8, Iron 38, TIBC 220  L, Ferritin 89.9, Total 
Bilirubin 0.5, AST 32, ALT 38, Alkaline Phosphatase 118, Troponin I < 0.01, C-
Reactive Prot, Quant 1.9  H, Total Protein 5.8  L, Albumin 2.8  L, Globulin 3.0,
Albumin/Globulin Ratio 0.9  L, Amylase 31, Lipase 23, CBC w Diff NO MAN DIFF REQ
, RBC 4.57, MCV 88.9, MCH 29.3, MCHC 33.0, RDW 14.0, MPV 7.4, Gran % 73.0, 
Lymphocytes % 14.5  L, Monocytes % 4.5, Eosinophils % 7.9  H, Basophils % 0.1, 
Absolute Granulocytes 8.2  H, Absolute Lymphocytes 1.6, Absolute Monocytes 0.5, 
Absolute Eosinophils 0.9, Absolute Basophils 0
 Microbiology
06/25 0838  STOOL: Stool Culture - RECD
06/24 1930  BLOOD: Blood Culture - RECD
06/24 1915  BLOOD: Blood Culture - RECD
06/24 1855  STOOL: Cryptosporidium Antigen - COLB
06/24 1855  STOOL: Giardia Antigen (JOHN) - COLB
06/24 1855  STOOL: Clostridium difficile Toxin A & B - COLB
 
 
 
Assessment/Plan
Assessment:
77 year old female with HLD, PUD s/p EGD tx'd with PPI, doesn't recall being 
treated with antibiotics for Hpylori, depression, former 30pack-year smoker quit
40 years ago, and a remote history of colitis (uncertain tx), with last 
colonoscopy and EGD 7 years ago, reportedly no polyps, evaluated by Dr. Yolis Schwab [place] presented with complaints of abdominal pain, nausea, 
vomiting, and diarrhea.
 
Colitis:
 Most likely infectious colitis with acute onset
 Check stool studies, culture, C diff, ova and parasites needs to be reordered 
tomorrow
 Gastroenterology consultation
 Continue ceftriaxone and metronidazole intravenously
 Will need follow up colonoscopy
 Lactic acidemia resolved with IVFs
 Clear liquid diet for now will plan to advance as condition improves
 Obtain records from Dr. Yolis Basurto
 
CT
Diffuse thickening involving the distal transverse colon, descending colon, and 
sigmoid colon with areas of pericolonic inflammatory stranding. A small amount 
of ascites is seen for example in the right paracolic gutter and layering 
dependently within the pelvis.
There is a possible gastric fundus diverticulum versus adrenal lesion which 
measures 2.7 cm (series 2 image 22).
Numerous noncalcified pulmonary nodules, largest up to 9mm
 
Lung nodules:
 Outpatient pulmnology referral for further evaluation
 May need PET scan
 
HLD:
 Continue pravastatin
 
Depression:
 Continue paxil
 
PO PPI, continue vitamin supplementation
Clear liquid diet
DVT ppx-heparin sc and ALPs
DNR/DNI
Problem List:
 1. Infectious colitis
 
 2. Gastric lesion
 
 3. Bloody stool
 
Pain Rating: 3
Pain Location:
lower abdomen
Pain Goal: Pain 4 or less
Pain Plan:
morphine prn
Tomorrow's Labs & Rationales:
cbc, bep
 
 
Silvestre BARRIOS,Syed 06/25/18 1247:
Attending MD Review Statement
 
Attending Statement
Attending MD Statement: examined this patient, discuss w/resident/PA/NP, agreed 
w/resident/PA/NP, reviewed EMR data (avail), discussed with nursing, discussed 
with case mgmt, amended to note
Attending Assessment/Plan:
Patient seen and examined.  She is a very pleasant elderly lady.  Continues to 
complain of diffuse abdominal pain on and off.  Reports some nausea but denies 
any vomiting.  Had a loose bowel movement earlier on.  She is afebrile.  She is 
hemodynamically stable.On examination  She did not appear to be in acute 
distress.  Abdomen is nondistended, soft with normal bowel sounds.  Mild 
tenderness in the lower quadrants.  No rebound or guarding.
 
Problems:
1.  Colitis
2.  Incidentally noted multiple pulmonary nodules.
3.  Gastric lesion.
4.  Eosinophilia. 
 
Plan:
-Continue empiric treatment for infectious colitis with Rocephin and Flagyl.  
Obtain records of prior endoscopies.  Awaiting GI evaluation.
-As an outpatient she should be referred to the pulmonology service.  She should
have chest PT for further evaluation of the pulmonary nodules noted 
incidentally.  This was discussed with the patient she verbalized understanding.
 She is currently asymptomatic from a pulmonary standpoint.
- Unexplained eosinophila. Check stool ova and parasites.

## 2018-06-25 NOTE — CONS- GASTROENTEROLOGY
General Information and HPI
 
Consulting Request
Date of Consult: 06/25/18
Requested By:
Syed Rivers MD
 
Reason for Consult:
1.  Abdominal pain
2.  Diarrhea
3.  Abnormal CT scan of the abdomen and pelvis
Source of Information: patient, electronic medical record
Exam Limitations: no limitations
History of Present Illness:
Patient is a 77-year-old female was in her usual state of health until 4 days 
prior to admission.  She is generally on the constipated side but had been more 
constipated than normal.  She had been unable to have a bowel movement for 3 or 
4 days and had taken a laxative.  After moving her bowels with some difficulty 
she developed diffuse abdominal pain which was followed by bloody diarrhea.  She
went to see her PCP who started her on antibiotics and diagnosed her with 
diverticulitis.  Her diarrhea and abdominal pain persisted and was complicated 
by nausea related to Flagyl.  On admission a CT scan of the abdomen and pelvis 
was obtained.  The results are as follows.
 
 
FINDINGS:
 
LUNG BASES: There are numerous noncalcified nodules involving the bilateral
lower lungs largest in the left lower lobe measuring up to 9 mm. Metastatic
disease cannot be excluded.
 
LIVER, GALLBLADDER, AND BILIARY TREE: The liver is normal in size, shape, and
attenuation. No focal hepatic lesion or biliary ductal dilatation is present.
The gallbladder is unremarkable with no evidence of radiopaque gallstones,
gallbladder wall thickening, or obvious pericholecystic inflammatory changes.
 
 
PANCREAS: Unremarkable.
 
SPLEEN: Unremarkable.
 
ADRENAL GLANDS: Unremarkable.
 
KIDNEYS AND URETERS: The kidneys are normal in size, shape, and attenuation.
No hydronephrosis, hydroureter, or calculi seen. No perinephric stranding.
 
BLADDER: Unremarkable.
 
GASTROINTESTINAL TRACT: There is diffuse thickening involving the distal
transverse colon, descending colon, and sigmoid colon with areas of
pericolonic inflammatory stranding. A small amount of ascites is seen for
example in the right paracolic gutter and layering dependently within the
pelvis. There is liquid stool within the sigmoid colon. The stomach appears
normal. There is a possible gastric fundus diverticulum versus adrenal lesion
which measures 2.7 cm (series 2 image 22). The appendix is not well seen.
 
ABDOMINAL WALL: No significant hernia is appreciated.
 
LYMPH NODES: Normal.
 
VASCULAR: Atheromatous changes within the abdominal aorta and its branch
vessels.
 
PELVIC VISCERA: A 1.5 cm fibroid is noted within the uterus. There is a left
adnexal cyst measuring 3 cm. The right adnexa appears normal.
 
OSSEOUS STRUCTURES: Multilevel degenerative changes in the lumbar spine. No
destructive osseous.
 
IMPRESSION:
- Diffuse colonic wall thickening with pericolonic inflammatory stranding and
small amount of ascites. The findings are concerning for
infectious/laboratory colitis. There is liquid stool within the distal colon.
No evidence of perforation.
- A nonspecific 2.7 cm lesion seen in the region of the gastric on this and
may represent a gastric diverticulum or alternatively could be related to the
adrenal gland. Attention to this lesion is recommended at the time of
follow-up imaging.
- Multiple pulmonary nodules at the lung bases the largest on the left
measuring up to 9 mm. Metastatic disease cannot be excluded.
- Fibroid uterus and left adnexal cyst.
 
 
 
 
Allergies/Medications
Allergies:
Coded Allergies:
No Known Allergies (06/24/18)
 
Home Med List:
Cholecalciferol (Vitamin D3) (Vitamin D) 1,000 UNIT TABLET   1 TAB PO DAILY 
SUPPLEMENT  (Reported)
Ciprofloxacin HCl 500 MG TABLET   1 TAB PO BID ABX  (Reported)
Cyanocobalamin (Vitamin B-12) 1,000 MCG TABLET   1 TAB PO EOD SUPPLEMENT  (
Reported)
Metronidazole 500 MG TABLET   1 TAB PO BID ABX  (Reported)
Omeprazole Magnesium (Prilosec Otc) 20 MG TABLET.DR   1 TAB PO DAILY peptic 
ulcer disease  (Reported)
Paroxetine HCl 20 MG TABLET   1 TAB PO DAILY MENTAL HEALTH  (Reported)
Pravastatin Sodium 20 MG TABLET   1 TAB PO DAILY CHOLESTEROL  (Reported)
 
Current Medications:
 Current Medications
 
 
  Sig/Maria E Start time  Last
 
Medication Dose Route Stop Time Status Admin
 
Acetaminophen 650 MG Q6P PRN 06/24 1815 AC 06/25
 
  PO   0747
 
Ceftriaxone Sodium 1,000 MG Q24H 06/25 1600 DC 
 
  IV   
 
Ceftriaxone Sodium 1,000 MG Q24H 06/25 0900 AC 06/25
 
  IV   0827
 
Ceftriaxone Sodium 0 .STK-MED ONE 06/24 1707 DC 
 
  .ROUTE   
 
Ceftriaxone Sodium 1,000 MG ONCE ONE 06/24 1645 DC 06/24
 
  IV 06/24 1646  1718
 
Cholecalciferol 1,000 IU DAILY 06/25 0900 AC 06/25
 
  PO   0827
 
Cyanocobalamin 1,000 MCG Q48 06/25 0900 AC 06/25
 
  PO   0827
 
Dextrose/Sodium  1,000 ML Q20H 06/24 1730 AC 06/25
 
Chloride  IV   0606
 
Heparin Sodium  5,000 UNIT Q8 06/24 2200 CAN 
 
(Porcine)  SC   
 
Heparin Sodium  5,000 UNIT Q8 06/24 1724 AC 06/25
 
(Porcine)  SC   0603
 
Metronidazole 500 MG IQ8 06/25 0000 AC 06/25
 
N/A 1 UNIT IV   0827
 
Metronidazole 500 MG ONCE ONE 06/24 1645 DC 06/24
 
N/A 1 UNIT IV 06/24 1744  1718
 
Morphine Sulfate 4 MG Q6P PRN 06/25 1030 AC 06/25
 
  IV   1039
 
Ondansetron HCl 4 MG Q6P PRN 06/25 0745 AC 06/25
 
  IV   0747
 
Paroxetine HCl 20 MG DAILY 06/25 0900 AC 06/25
 
  PO   0827
 
Potassium Chloride 40 MEQ ONCE ONE 06/25 1412 DC 
 
  PO 06/25 1413  
 
Pravastatin Sodium 20 MG 1700 06/25 1700 AC 
 
  PO   
 
Sodium Chloride 1,000 ML BOLUS ONE 06/24 1500 DC 06/24
 
  IV 06/24 1559  1549
 
Sodium Chloride 500 ML BOLUS ONE 06/24 1430 DC 06/24
 
  IV 06/24 1529  1441
 
 
 
 
Past History
 
Travel History
Traveled to [place] past 21 day No
 
Medical History
Blood Transfusion Hx: No
Neurological: NONE
EENT: NONE
Cardiovascular: LOW BLOOD PRESSURE
Respiratory: NONE
Gastrointestinal: DIVERTICULITIS
Hepatic: NONE
Renal: NONE
Musculoskeletal: NONE
Psychiatric: depression
Endocrine: NONE
Blood Disorders: NONE
Cancer(s): NONE
GYN/Reproductive: NONE
 
Surgical History
Surgical History: appendectomy
 
Psychosocial History
Where Do You Live? Home
Smoking Status: Former Smoker
 
Review of Systems
 
Review of Systems
Constitutional:
Reports: see HPI. 
EENTM:
Reports: no symptoms. 
Cardiovascular:
Reports: no symptoms. 
Respiratory:
Reports: no symptoms. 
GI:
Reports: see HPI. 
Genitourinary:
Reports: no symptoms. 
Musculoskeletal:
Reports: no symptoms. 
Skin:
Reports: no symptoms. 
Neurological/Psychological:
Reports: other (Mood disorder). 
Hematologic/Endocrine:
Reports: no symptoms. 
 
Exam & Diagnostic Data
Vital Signs and I&O
Vital Signs
 
 
Date Time Temp Pulse Resp B/P B/P Pulse O2 O2 Flow FiO2
 
     Mean Ox Delivery Rate 
 
06/25 1416 98.0 77 20 118/70  96 Room Air  
 
06/25 0607 99.2 86 20 96/56  96 Room Air  
 
06/24 2130 98.2 80 20 100/58  96 Room Air  
 
06/24 2120  80  100/58     
 
06/24 1859       Room Air  
 
06/24 1818 98.4 80 16 112/66  98 Room Air  
 
 
 Intake & Output
 
 
 06/25 1600 06/25 0400 06/24 1600 06/24 0400 06/23 1600 06/23 0400
 
Intake Total 800 200 0   
 
Output Total 650     
 
Balance 150 200 0   
 
       
 
Intake,  200    
 
Intake, Oral 0 0 0   
 
Number 1     
 
Bowel      
 
Movements      
 
Output, Urine 650     
 
Patient  132 lb 141 lb   
 
Weight      
 
Weight  Bed scale    
 
Measurement      
 
Method      
 
 
 
 
Physical Exam
General Appearance: well developed/nourished, no apparent distress, alert, 
comfortable
Head: atraumatic, normal appearance
Eyes:
Bilateral: normal appearance. 
Ears, Nose, Throat: hearing grossly normal
Neck: normal inspection, supple, full range of motion
Respiratory: normal breath sounds, chest non-tender, lungs clear
Cardiovascular: regular rate/rhythm, normal S1 and S2 without rub murmur or 
gallop.
Gastrointestinal: normal bowel sounds, soft, no organomegaly, mild diffuse 
tenderness without rebound or guarding.
Extremities: normal inspection, no edema
Neurologic/Psych: awake, alert, oriented x 3
Skin: intact, normal color, warm/dry
 
Results
Pertinent Lab Results:
 Laboratory Tests
 
 
 06/25 06/25
 
 0758 0145
 
Chemistry  
 
  Sodium (137 - 145 mmol/L) 137 
 
  Potassium (3.5 - 5.1 mmol/L) 3.4  L 
 
  Chloride (98 - 107 mmol/L) 105 
 
  Carbon Dioxide (22 - 30 mmol/L) 25 
 
  Anion Gap (5 - 16) 7 
 
  BUN (7 - 17 mg/dL) 11 
 
  Creatinine (0.5 - 1.0 mg/dL) 0.7 
 
  Estimated GFR (>60 ml/min) > 60 
 
  BUN/Creatinine Ratio (7 - 25 %) 15.7 
 
  Hemoglobin A1c (4.2 - 5.8 %) 5.6 
 
  Magnesium (1.6 - 2.3 mg/dL) 1.7 
 
Hematology  
 
  CBC w Diff MAN DIFF ORDERED 
 
  WBC (4.8 - 10.8 /CUMM) 14.1  H 
 
  RBC (4.20 - 5.40 /CUMM) 3.79  L 
 
  Hgb (12.0 - 16.0 G/DL) 11.4  L 
 
  Hct (37 - 47 %) 34.0  L 
 
  MCV (81.0 - 99.0 FL) 89.9 
 
  MCH (27.0 - 31.0 PG) 30.0 
 
  MCHC (33.0 - 37.0 G/DL) 33.3 
 
  RDW (11.5 - 14.5 %) 14.1 
 
  Plt Count (130 - 400 /CUMM) 594  H 
 
  MPV (7.4 - 10.4 FL) 7.8 
 
  Gran % (42.2 - 75.2 %) 59.5 
 
  Lymphocytes % (20.5 - 51.1 %) 13.4  L 
 
  Monocytes % (1.7 - 9.3 %) 8.5 
 
  Eosinophils % (0 - 5 %) 18.5  H 
 
  Basophils % (0.0 - 2.0 %) 0.1 
 
  Absolute Granulocytes (1.4 - 6.5 /CUMM) 8.4  H 
 
  Segmented Neutrophils (42.2 - 75.2 %) 46 
 
  Band Neutrophils (0.0 - 5.0 %) 10  H 
 
  Absolute Lymphocytes (1.2 - 3.4 /CUMM) 1.9 
 
  Lymphocytes (20.5 - 51.1 %) 16  L 
 
  Monocytes (1.7 - 9.3 %) 7 
 
  Absolute Monocytes (0.10 - 0.60 /CUMM) 1.2  H 
 
  Eosinophils (0 - 5.0 %) 21  H 
 
  Absolute Eosinophils (0.0 - 0.7 /CUMM) 2.6 
 
  Absolute Basophils (0.0 - 0.2 /CUMM) 0 
 
  Platelet Estimate (ADEQUATE) INCREASED 
 
  Normochromic RBCs VERIFIED 
 
  Basophilic Stippling SLIGHT 
 
  Anisocytosis 1+ 
 
Urines  
 
  Urine Color (YEL,AMB,STR)  KANE
 
  Urine Clarity (CLEAR)  CLEAR
 
  Urine pH (5.0 - 8.0)  6.0
 
  Ur Specific Gravity (1.001 - 1.035)  1.015
 
  Urine Protein (NEG,<30 MG/DL)  TRACE  H
 
  Urine Ketones (NEG)  40  H
 
  Urine Nitrite (NEG)  NEG
 
  Urine Bilirubin (NEG)  NEG
 
  Urine Urobilinogen (0.1  -  1.0 EU/dl)  1.0
 
  Ur Leukocyte Esterase (NEG)  NEG
 
  Ur Microscopic  SEDIMENT EXAMINED
 
  Urine RBC (0 - 5 /HPF)  3-5
 
  Urine WBC (0 - 2 /HPF)  5-10  H
 
  Ur Epithelial Cells (NONE,FEW)  FEW
 
  Urine Bacteria (NEG/NONE)  MANY  H
 
  Urine Hemoglobin (NEG)  SMALL  H
 
  Urine Glucose (N MG/DL)  NEG
 
 
 
 
 06/24 06/24 06/24 06/24
 
 2105 1915 1715 1430
 
Chemistry    
 
  Sodium (137 - 145 mmol/L)    138
 
  Potassium (3.5 - 5.1 mmol/L)    3.9
 
  Chloride (98 - 107 mmol/L)    100
 
  Carbon Dioxide (22 - 30 mmol/L)    26
 
  Anion Gap (5 - 16)    12
 
  BUN (7 - 17 mg/dL)    16
 
  Creatinine (0.5 - 1.0 mg/dL)    0.8
 
  Estimated GFR (>60 ml/min)    > 60
 
  BUN/Creatinine Ratio (7 - 25 %)    20.0
 
  Glucose (65 - 99 mg/dL)    144  H
 
  Lactic Acid (0.7 - 2.1 mmol/L) 0.9  1.1 2.8  H
 
  Calcium (8.4 - 10.2 mg/dL)    8.8
 
  Iron (37 - 170 ug/dL)    38
 
  TIBC (265 - 497 ug/dL)    220  L
 
  Ferritin (11.1 - 264 ng/mL)    89.9
 
  Total Bilirubin (0.2 - 1.3 mg/dL)    0.5
 
  AST (14 - 36 U/L)    32
 
  ALT (9 - 52 U/L)    38
 
  Alkaline Phosphatase (<127 U/L)    118
 
  Troponin I (< 0.11 ng/ml)    < 0.01
 
  C-Reactive Prot, Quant (<1.0 mg/dL)    1.9  H
 
  Total Protein (6.3 - 8.2 g/dL)    5.8  L
 
  Albumin (3.5 - 5.0 g/dL)    2.8  L
 
  Globulin (1.9 - 4.2 gm/dL)    3.0
 
  Albumin/Globulin Ratio (1.1 - 2.2 %)    0.9  L
 
  Amylase (30 - 110 U/L)    31
 
  Lipase (23 - 300 U/L)    23
 
Hematology    
 
  CBC w Diff    NO MAN DIFF REQ
 
  WBC (4.8 - 10.8 /CUMM)    11.2  H
 
  RBC (4.20 - 5.40 /CUMM)    4.57
 
  Hgb (12.0 - 16.0 G/DL)    13.4
 
  Hct (37 - 47 %)    40.6
 
  MCV (81.0 - 99.0 FL)    88.9
 
  MCH (27.0 - 31.0 PG)    29.3
 
  MCHC (33.0 - 37.0 G/DL)    33.0
 
  RDW (11.5 - 14.5 %)    14.0
 
  Plt Count (130 - 400 /CUMM)    632  H
 
  MPV (7.4 - 10.4 FL)    7.4
 
  Gran % (42.2 - 75.2 %)    73.0
 
  Lymphocytes % (20.5 - 51.1 %)    14.5  L
 
  Monocytes % (1.7 - 9.3 %)    4.5
 
  Eosinophils % (0 - 5 %)    7.9  H
 
  Basophils % (0.0 - 2.0 %)    0.1
 
  Absolute Granulocytes (1.4 - 6.5 /CUMM)    8.2  H
 
  Absolute Lymphocytes (1.2 - 3.4 /CUMM)    1.6
 
  Absolute Monocytes (0.10 - 0.60 /CUMM)    0.5
 
  Absolute Eosinophils (0.0 - 0.7 /CUMM)    0.9
 
  Absolute Basophils (0.0 - 0.2 /CUMM)    0
 
  ESR Westergren (0 - 20 MM)  30  H  
 
 
 
 
Assessment/Plan
Assessment/Recommendations:
IMPRESSION:
1.  Abnormal CT scan of the abdomen and pelvis
2.  Abdominal discomfort associated with bloody diarrhea
3.  Leukocytosis
4.  Eosinophilia
 
Patient's clinical picture is most consistent with ischemic colitis in the 
setting of constipation.  Given inflammatory changes involving the distal 
transverse colon and descending colon as well as the history of preceding 
constipation followed by abdominal pain and bloody diarrhea which is now 
resolving this is a more likely diagnosis and plan either infectious colitis or 
acute diverticulitis
 
RECOMMENDATIONS:
1.  Continue antibiotics as you have done
2.  Maintain on clear liquid diet
3.  Follow labs and replete electrolytes.  Patient is hypokalemic.  Would also 
check magnesium, phosphorus and calcium.  
4.  Repeat CBC with differential given eosinophilia to make sure she has true 
eosinophilia.  Would also follow white blood cell count which was elevated.
5.  Stool for lactoferrin, C&S, O&P, given eosinophilia.  
6.  I've discussed this with the patient and her .  I've explained to her
that at discharge she will need to work on having a more regular bowel habit 
prevent recurrent episodes of ischemic colitis.
7.  She has a regular gastroenterologist who she will follow-up with as an 
outpatient.
 
 
Consult Acknowledgment
- Thank you for your consult request.

## 2018-06-25 NOTE — DISCHARGE SUMMARY
Visit Information
 
Visit Dates
Admission Date:
06/24/18
 
Discharge Date:
06/27/18
 
Hospital Course
 
Course
Attending Physician:
Syed Rivers MD
 
Primary Care Physician:
[facility]:
 77 year old female former smoker with past medical history significant for 
hyperlipidemia, depression, peptic ulcer disease, EGD negative for H. pylori, 
remote history of colitis 40 years ago, and constipation on dulcolax presented 
with complaints of abdominal pain, nausea, vomiting, and diarrhea.  Her symptoms
started with crampy lower abdominal pain, followed by 4-5 episodes/day of watery
diarrhea with some visible blood mixed in and on the toilet paper, without 
fevers, chills, sick contacts, or recent travel.  She was evaluated by her 
doctor, labs at that time were notable for leukocytosis, and she was scheduled 
for an outpatient CT of the abdomen.  She was diagnosed with diverticulitis and 
start on ciprofloxacin and metronidazole.  She then developed nausea and 
vomiting around the time of initiating antibiotics, of which she was able to 
take one days worth before presentation to the emergency department.   She was 
afebrile on presentation with leukocytosis and bandemia.  Lactic acid was normal
without tachycardia or hypotension.  She underwent a CT scan in the emergency 
department which showed diffuse thickening involving the distal transverse colon
, descending colon, and sigmoid colon with areas of pericolonic inflammatory 
stranding.  She was started on intravenous fluids, ceftriaxone and metronidazole
intravenously, morphine and zofran prn.  Gastroenterology was consulted and 
thought her colitis was likely ischemic and recommended continuing antibiotics, 
with plan for outpatient follow up and colonoscopy in 6-8 weeks.  The patient's 
diet was advanced with no more episodes of vomiting.  She did continue to have 
diarrhea, which was negative for C. diff and shiga toxin.  Unfortunately, ova 
and parasite studies were unable to be performed due to an insufficient specimen
, especially considering she had an persistently elevated eosinophilia, for 
which a CBC should be repeat as an outpatient after her condition improves.  Her
CT of the abdomen also showed numerous noncalcified pulmonary nodules in the 
bilateral lower lobes, largest up to 9mm, for which she was given an outpatient 
pulmonology referral.  She was instructed to follow up with her primary care, 
gastroenterologist, and pulmonology after discharge and to continue 
ciprofloxacin and metronidazole for a total ten day course of antibiotics.
Allergies:
Coded Allergies:
No Known Allergies (06/24/18)
 
Significant Procedures:
  SERVICE DATE: 06/24/
EXAM TYPE: CAT - CT ABD & PELVIS W IV CONTRAST
 
EXAMINATION:
CT ABDOMEN AND PELVIS WITH CONTRAST
 
CLINICAL INFORMATION:
Diverticulitis on Cipro. Abdominal pain and vomiting.
 
COMPARISON:
None available.
 
TECHNIQUE:
Multidetector volumetric imaging was performed of the abdomen and pelvis
following IV administration of 95 mL of Optiray 320 intravenous contrast.
Sagittal and coronal reformatted images were obtained on the technologist's
workstation.
 
DLP:
277 mGy-cm
 
FINDINGS:
 
LUNG BASES: There are numerous noncalcified nodules involving the bilateral
lower lungs largest in the left lower lobe measuring up to 9 mm. Metastatic
disease cannot be excluded.
 
LIVER, GALLBLADDER, AND BILIARY TREE: The liver is normal in size, shape, and
attenuation. No focal hepatic lesion or biliary ductal dilatation is present.
The gallbladder is unremarkable with no evidence of radiopaque gallstones,
gallbladder wall thickening, or obvious pericholecystic inflammatory changes.
 
 
PANCREAS: Unremarkable.
 
SPLEEN: Unremarkable.
 
ADRENAL GLANDS: Unremarkable.
 
KIDNEYS AND URETERS: The kidneys are normal in size, shape, and attenuation.
No hydronephrosis, hydroureter, or calculi seen. No perinephric stranding.
 
BLADDER: Unremarkable.
 
GASTROINTESTINAL TRACT: There is diffuse thickening involving the distal
transverse colon, descending colon, and sigmoid colon with areas of
pericolonic inflammatory stranding. A small amount of ascites is seen for
example in the right paracolic gutter and layering dependently within the
pelvis. There is liquid stool within the sigmoid colon. The stomach appears
normal. There is a possible gastric fundus diverticulum versus adrenal lesion
which measures 2.7 cm (series 2 image 22). The appendix is not well seen.
 
ABDOMINAL WALL: No significant hernia is appreciated.
 
LYMPH NODES: Normal.
 
VASCULAR: Atheromatous changes within the abdominal aorta and its branch
vessels.
 
PELVIC VISCERA: A 1.5 cm fibroid is noted within the uterus. There is a left
adnexal cyst measuring 3 cm. The right adnexa appears normal.
 
OSSEOUS STRUCTURES: Multilevel degenerative changes in the lumbar spine. No
destructive osseous.
 
IMPRESSION:
- Diffuse colonic wall thickening with pericolonic inflammatory stranding and
small amount of ascites. The findings are concerning for
infectious/laboratory colitis. There is liquid stool within the distal colon.
No evidence of perforation.
- A nonspecific 2.7 cm lesion seen in the region of the gastric on this and
may represent a gastric diverticulum or alternatively could be related to the
adrenal gland. Attention to this lesion is recommended at the time of
follow-up imaging.
- Multiple pulmonary nodules at the lung bases the largest on the left
measuring up to 9 mm. Metastatic disease cannot be excluded.
- Fibroid uterus and left adnexal cyst.
 
Disposition Summary
 
Disposition
Principal Diagnosis:
Colitis, probable ischemic colitis
Diarrhea, nausea and vomiting with inability to take oral antibiotics at home
Leukocytosis with eosinophilia
Thrombocytosis
Hypokalemia
Additional Diagnosis:
Hyperlipidemia
Peptic ulcer disease
Constipation
Depression
Discharge Disposition: home or self care
 
Discharge Instructions
 
General Discharge Information
Code Status: Do Not Resucitate/Intubat
Patient's Diet:
Regular diet
Patient's Activity:
As tolerated
Follow-Up Instructions/Appts:
Please follow up with your primary care physician and gastroenterologist for 
follow up colonoscopy.  Please finish your antibiotics as prescribed.  You have 
been given a referral to Dr. Rosenthal of GI.
 
Medications at Discharge
Discharge Medications:
Continue taking these medications:
Pravastatin Sodium (Pravastatin Sodium) 20 MG TABLET
    1 Tablet ORAL DAILY
    Qty = 90
    Comments:
       Last Taken: 6/26/18
             Time: 4:20PM
 
Paroxetine HCl (Paroxetine HCl) 20 MG TABLET
    1 Tablet ORAL DAILY
    Qty = 90
    Comments:
       Last Taken: 6/27/18
             Time: 9:12AM
 
Metronidazole (Metronidazole) 500 MG TABLET
    1 Tablet ORAL TWICE DAILY
    Qty = 20
    Instructions:
       stop 7/4/18
    Comments:
       Last Taken: 9/27/18
             Time: 9:12AM
 
Ciprofloxacin HCl (Ciprofloxacin HCl) 500 MG TABLET
    1 Tablet ORAL TWICE DAILY
    Qty = 20
    Instructions:
       stop 7/4/18
    Comments:
       NOT GIVEN IN HOSPITAL
 
Cyanocobalamin (Vitamin B-12) 1,000 MCG TABLET
    1 Tablet ORAL Every other day
    Comments:
       Last Taken: 9/27/18
        
             Time: 9:12AM
 
Cholecalciferol (Vitamin D3) (Vitamin D) 1,000 UNIT TABLET
    1 Tablet ORAL DAILY
    Comments:
       Last Taken: 6/27/18
             Time: 9:12 AM
 
Omeprazole Magnesium (Prilosec Otc) 20 MG TABLET.DR
    1 Tablet ORAL DAILY
    Comments:
       NOT GIVEN IN HOSPITAL
 
Start taking the following new medications:
Polyethylene Glycol 3350 (Miralax) 17 GRAM POWD.PACK
    1 Packet ORAL DAILY
    Qty = 30
    No Refills
    Instructions:
       dissolve in water
    Comments:
       NOT GIVEN IN HOSPITAL
 
 
Copies To:
Dina Rosenthal MD; Ambika Mejía
 
Attending MD Review Statement
Documenting Attending:
Syed Rivers MD
Other Findings:
Medically stable to be discharged home.
 
Attending MD Review Statement
Documenting Attending:
Syed Rivers MD
Other Findings:
Medically stable to be discharged home.

## 2018-06-25 NOTE — ADMISSION CERTIFICATION
Admission Certification
 
Certification Statement
- As attending physician, I certify that at the time of
- admission, based on clinical presentation, severity of
- symptoms, need for further diagnostic testing and
- therapeutic interventions, and risk of adverse outcomes
- without in-hospital treatment, in my clinical assessment,
- this patient requires an acute hospital stay for a minimum
- of two nights or longer. I have also considered psychsocial
- factors such as support system, advanced age, financial
- issues, cognitive issues, and failed out-patient treatments,
- past re-admission history, safety of patient, and lack of
- compliance as applicable.
Specific rationale supporting this admission is:
Colitis, dehydration

## 2018-06-26 VITALS — DIASTOLIC BLOOD PRESSURE: 58 MMHG | SYSTOLIC BLOOD PRESSURE: 100 MMHG

## 2018-06-26 VITALS — DIASTOLIC BLOOD PRESSURE: 64 MMHG | SYSTOLIC BLOOD PRESSURE: 108 MMHG

## 2018-06-26 VITALS — SYSTOLIC BLOOD PRESSURE: 114 MMHG | DIASTOLIC BLOOD PRESSURE: 82 MMHG

## 2018-06-26 LAB
ABSOLUTE GRANULOCYTE CT: 6.6 /CUMM (ref 1.4–6.5)
BASOPHILS # BLD: 0 /CUMM (ref 0–0.2)
BASOPHILS NFR BLD: 0.3 % (ref 0–2)
EOSINOPHIL # BLD: 2 /CUMM (ref 0–0.7)
EOSINOPHIL NFR BLD: 17.4 % (ref 0–5)
ERYTHROCYTE [DISTWIDTH] IN BLOOD BY AUTOMATED COUNT: 14.3 % (ref 11.5–14.5)
GRANULOCYTES NFR BLD: 58.6 % (ref 42.2–75.2)
HCT VFR BLD CALC: 34.6 % (ref 37–47)
LYMPHOCYTES # BLD: 1.7 /CUMM (ref 1.2–3.4)
MCH RBC QN AUTO: 29.5 PG (ref 27–31)
MCHC RBC AUTO-ENTMCNC: 32.8 G/DL (ref 33–37)
MCV RBC AUTO: 89.8 FL (ref 81–99)
MONOCYTES # BLD: 1 /CUMM (ref 0.1–0.6)
PLATELET # BLD: 557 /CUMM (ref 130–400)
PMV BLD AUTO: 7.9 FL (ref 7.4–10.4)
RED BLOOD CELL CT: 3.85 /CUMM (ref 4.2–5.4)
WBC # BLD AUTO: 11.3 /CUMM (ref 4.8–10.8)

## 2018-06-26 NOTE — PN- HOUSESTAFF
**See Addendum**
Subjective
Follow-up For:
colitis
Subjective:
abdominal pain and diarrhea have improved but patient is complaining of alot of 
gas
on clear liquid diet, afebrile on antibiotics
 
Review of Systems
Constitutional:
Reports: see HPI. 
 
Objective
Last 24 Hrs of Vital Signs/I&O
 Vital Signs
 
 
Date Time Temp Pulse Resp B/P B/P Pulse O2 O2 Flow FiO2
 
     Mean Ox Delivery Rate 
 
 0533 98.1 82 20 100/58  94 Room Air  
 
 2221 98.3 80 20 120/60  90 Room Air  
 
 1416 98.0 77 20 118/70  96 Room Air  
 
 
 Intake & Output
 
 
  1600  0800  0000
 
Intake Total  800 400
 
Output Total  650 350
 
Balance  150 50
 
    
 
Intake, IV  800 400
 
Intake, Oral  0 0
 
Output, Urine  650 350
 
Patient  63.078 kg 
 
Weight   
 
 
 
 
Physical Exam
General Appearance: Alert, Oriented X3, Cooperative, No Acute Distress
Cardiovascular: Regular Rate, Normal S1, Normal S2, No Murmurs
Lungs: Clear to Auscultation, Normal Air Movement
Abdomen: Normal Bowel Sounds, Soft, No Masses, tenderness to palpation is nearly
resolved
Extremities: No Clubbing, No Cyanosis, No Edema, Normal Pulses
Current Medications:
 Current Medications
 
 
  Sig/Maria E Start time  Last
 
Medication Dose Route Stop Time Status Admin
 
Acetaminophen 650 MG Q6P PRN  1815 AC 
 
  PO   0747
 
Ceftriaxone Sodium 1,000 MG Q24H  0900 AC 
 
  IV   0827
 
Cholecalciferol 1,000 IU DAILY  0900 AC 
 
  PO   0827
 
Cyanocobalamin 1,000 MCG Q48  0900 AC 
 
  PO   0827
 
Dextrose/Sodium  1,000 ML Q20H  1730 AL 
 
Chloride  IV   0555
 
Heparin Sodium  5,000 UNIT Q8  1724 AC 
 
(Porcine)  SC   0555
 
Metronidazole 500 MG IQ8  0000 AC 
 
N/A 1 UNIT IV   2344
 
Morphine Sulfate 4 MG Q6P PRN  1030 AC 
 
  IV   1651
 
Ondansetron HCl 4 MG Q6P PRN  0745 AC 
 
  IV   0747
 
Paroxetine HCl 20 MG DAILY  0900 AC 
 
  PO   0827
 
Potassium Chloride 40 MEQ ONCE ONE  1412 DC 
 
  PO  1413  1508
 
Pravastatin Sodium 20 MG 1700  1700 AC 
 
  PO   1651
 
 
 
 
Last 24 Hrs of Lab/John Results
Last 24 Hrs of Labs/Mics:
 Laboratory Tests
 
18 0618:
Sodium Pending, Potassium Pending, Chloride Pending, Carbon Dioxide Pending, 
Anion Gap Pending, BUN Pending, Creatinine Pending, BUN/Creatinine Ratio Pending
, Lactic Acid Pending, Calcium Pending, Phosphorus Pending, Magnesium Pending, 
CBC w Diff Pending, WBC Pending, RBC Pending, Hgb Pending, Hct Pending, MCV 
Pending, MCH Pending, MCHC Pending, RDW Pending, Plt Count Pending, MPV Pending,
Gran % Pending, Lymphocytes % Pending, Monocytes % Pending, Eosinophils % 
Pending, Basophils % Pending, Absolute Granulocytes Pending, Absolute 
Lymphocytes Pending, Absolute Monocytes Pending, Absolute Eosinophils Pending, 
Absolute Basophils Pending
 
18 1900:
RBC 3.92  L, MCV 89.3, MCH 29.5, MCHC 33.0, RDW 13.7, MPV 7.7, Gran % 61.5, 
Lymphocytes % 15.2  L, Monocytes % 3.9, Eosinophils % 18.9  H, Basophils % 0.5, 
Absolute Granulocytes 8.4  H, Absolute Lymphocytes 2.1, Absolute Monocytes 0.5, 
Absolute Eosinophils 2.6, Absolute Basophils 0.1
 Microbiology
 0805  STOOL: Cryptosporidium Antigen - COLB
 0805  STOOL: Giardia Antigen (JOHN) - COLB
 1403  STOOL: Cryptosporidium Antigen - CAN
                Cancelled: Cancelled via OE: Error
 1403  STOOL: Giardia Antigen (JOHN) - CAN
                Cancelled: Cancelled via OE: Error
 0838  STOOL: Clostridium difficile Toxin A & B - RECD
 08  STOOL: Stool Culture - RECD
 
 
 
Assessment/Plan
Assessment:
77 year old female with HLD, PUD s/p EGD tx'd with PPI, doesn't recall being 
treated with antibiotics for Hpylori, depression, former 30pack-year smoker quit
40 years ago, and a remote history of colitis (uncertain tx), with last 
colonoscopy and EGD 7 years ago, reportedly no polyps, evaluated by Dr. Yolis Schwab in [place] CT presented with complaints of abdominal pain, nausea, 
vomiting, and diarrhea.
 
Colitis:
 Follow up ova and parasites, C diff, and stool culture to evaluate for 
infectious colitis
 Gastroenterology consulted, mosty likely infectious colitis
 Significantly elevated eosinophils on complete blood count
 Continue ceftriaxone and metronidazole intravenously
 Will need follow up colonoscopy
 Clear liquid diet, consider advancing diet
 Obtain records from Dr. Yolis Basurto, last colonoscopy 7 years ago, reportedly 
normal
 egd H pylori negative
 
CT
Diffuse thickening involving the distal transverse colon, descending colon, and 
sigmoid colon with areas of pericolonic inflammatory stranding. A small amount 
of ascites is seen for example in the right paracolic gutter and layering 
dependently within the pelvis.
There is a possible gastric fundus diverticulum versus adrenal lesion which 
measures 2.7 cm (series 2 image 22).
Numerous noncalcified pulmonary nodules, largest up to 9mm
 
Lung nodules:
 Outpatient pulmnology referral for further evaluation
 May need PET scan as an outpatient
 
HLD:
 Continue pravastatin
 
Depression:
 Continue paxil
 
PO PPI, continue vitamin supplementation
Clear liquid diet, will advance to full liquids
DVT ppx-heparin sc and ALPs
DNR/DNI
Problem List:
 1. Bloody stool
 
 2. Infectious colitis
 
Pain Ratin
Pain Location:
abdominal
Pain Goal: Pain 4 or less
Pain Plan:
prn
Tomorrow's Labs & Rationales:
cbc

## 2018-06-26 NOTE — PATIENT DISCHARGE INSTRUCTIONS
Discharge Instructions
 
General Discharge Information
You were seen/treated for:
colitis
Special Instructions:
please follow up with your PCP.  Follow up with your GI doctor, you will need a 
colonoscopy in 6-8 weeks.  Take your antibiotics as directed.  You also need to 
follow up with the pulmonary doctor because of lung nodules on your CT scan.
 
Acute Coronary Syndrome
 
Inclusion Criteria
At DC or during hospital stay patient has or had the following:
ACS DIAGNOSIS No
 
Discharge Core Measures
Meds if any: Prescribed or Continued at Discharge
Meds if any: NOT Prescribed or Continued at Discharge
 
Congestive Heart Failure
 
Inclusion Criteria
At DC or during hospital stay patient has or had the following:
CHF DIAGNOSIS No
 
Discharge Core Measures
Meds if any: Prescribed or Continued at Discharge
Meds if any: NOT Prescribed or Continued at Discharge
 
Cerebrovascular accident
 
Inclusion Criteria
At DC or during hospital stay patient has or had the following:
CVA/TIA Diagnosis No
 
Discharge Core Measures
Meds if any: Prescribed or Continued at Discharge
Meds if any: NOT Prescribed or Continued at Discharge
 
Venous thromboembolism
 
Inclusion Criteria
VTE Diagnosis No
VTE Type NONE
VTE Confirmed by (Test) NONE
 
Discharge Core Measures
- Per Current guidelines, there needs to be overlap
- treatment for the first 5 days of Warfarin therapy.
- If discharged on Warfarin prior to 5 days of
- overlap therapy, the patient will need to be
- assessed for post discharge needs including
- *Post discharge parental anticoagulation
- *Warfarin and/or parental anticoagulation education
- *Follow up date to check INR post discharge
At least 5 days overlap therapy as Inpatient No
Meds if any: Prescribed or Continued at Discharge
Note: Overlap Therapy is Warfarin and Anticoagulant
Meds if any: NOT Prescribed or Continued at Discharge
 
Meds if any: NOT Prescribed or Continued at Discharge

## 2018-06-26 NOTE — PN- GASTROENTEROLOGY
Assessment/Plan GI
Assessment/Recommendations:
ASSESSMENT:
1.  Probable Ischemic Colitis
2.  Leukocytosis -- trending downwards on antibiotics
3.  Abnormal CT Scan of the Abdomen
 
RECOMMENDATIONS:
1.  Continue Antibiotics
2.  Advance diet gradually as tolerated
3.  Follow labs
4.  Patient will need outpatient colonoscopy in 6-8 weeks
5.  At discharge will need to be on Miralax daily for improvement in bowel 
habit.  
6.  Will need follow up with GI as an outpatient for monitoring of bowel habit 
and to ensure that patient has colonoscopy to complete work up and to make sure 
that patient does not develop complications of ischemic colitis.
 
 
Subjective
Subjective:
Patient with less abdominal pain and then yesterday.  Has had no further 
hematochezia.  Patient denies nausea, or vomiting.  
 
Objective
Vital Signs and I&Os
Vital Signs
 
 
Date Time Temp Pulse Resp B/P B/P Pulse O2 O2 Flow FiO2
 
     Mean Ox Delivery Rate 
 
06/26 0533 98.1 82 20 100/58  94 Room Air  
 
06/25 2221 98.3 80 20 120/60  90 Room Air  
 
06/25 1416 98.0 77 20 118/70  96 Room Air  
 
 
 Intake & Output
 
 
 06/26 1600 06/26 0400 06/25 1600 06/25 0400 06/24 1600 06/24 0400
 
Intake Total  200 0 
 
Output Total 850 350 650   
 
Balance -50 50 1000 200 0 
 
       
 
Intake,  327 5233 200  
 
Intake, Oral 0 0 50 0 0 
 
Number   2   
 
Bowel      
 
Movements      
 
Output, Urine 850 350 650   
 
Patient 139 lb   132 lb 141 lb 
 
Weight      
 
Weight    Bed scale  
 
Measurement      
 
Method      
 
 
 
 
Physical Exam
General Appearance: well developed/nourished, no apparent distress, alert, awake
Respiratory: normal breath sounds, quiet respiration, lungs clear
Cardiovascular: regular rate/rhythm
Abdomen: normal bowel sounds, soft, non-tender
Neurologic/Psychiatric: oriented x 3, normal mood/affect
Current Medications:
 Current Medications
 
 
  Sig/Maria E Start time  Last
 
Medication Dose Route Stop Time Status Admin
 
Acetaminophen 650 MG Q6P PRN 06/24 1815 AC 06/25
 
  PO   0747
 
Ceftriaxone Sodium 1,000 MG Q24H 06/25 0900 AC 06/26
 
  IV   1028
 
Cholecalciferol 1,000 IU DAILY 06/25 0900 AC 06/26
 
  PO   1029
 
Cyanocobalamin 1,000 MCG Q48 06/25 0900 AC 06/25
 
  PO   0827
 
Dextrose/Sodium  1,000 ML Q20H 06/24 1730 DC 06/26
 
Chloride  IV   0555
 
Heparin Sodium  5,000 UNIT Q8 06/24 1724 AC 06/26
 
(Porcine)  SC   0555
 
Metronidazole 500 MG IQ8 06/25 0000 AC 06/26
 
N/A 1 UNIT IV   1028
 
Morphine Sulfate 4 MG Q6P PRN 06/25 1030 AC 06/25
 
  IV   1651
 
Ondansetron HCl 4 MG Q6P PRN 06/25 0745 AC 06/25
 
  IV   0747
 
Paroxetine HCl 20 MG DAILY 06/25 0900 AC 06/26
 
  PO   1029
 
Potassium Chloride 20 MEQ ONCE ONE 06/26 0930 DC 06/26
 
  PO 06/26 0931  1029
 
Potassium Chloride 40 MEQ ONCE ONE 06/25 1412 DC 06/25
 
  PO 06/25 1413  1508
 
Pravastatin Sodium 20 MG 1700 06/25 1700 AC 06/25
 
  PO   1651
 
 
 
 
Results
Pertinent Lab Results:
 Laboratory Tests
 
 
 06/26 06/25
 
 0618 1900
 
Chemistry  
 
  Sodium (137 - 145 mmol/L) 136  L 
 
  Potassium (3.5 - 5.1 mmol/L) 3.7 
 
  Chloride (98 - 107 mmol/L) 104 
 
  Carbon Dioxide (22 - 30 mmol/L) 24 
 
  Anion Gap (5 - 16) 8 
 
  BUN (7 - 17 mg/dL) 6  L 
 
  Creatinine (0.5 - 1.0 mg/dL) 0.7 
 
  Estimated GFR (>60 ml/min) > 60 
 
  BUN/Creatinine Ratio (7 - 25 %) 8.6 
 
  Lactic Acid (0.7 - 2.1 mmol/L) 0.9 
 
  Calcium (8.4 - 10.2 mg/dL) 7.7  L 
 
  Phosphorus (2.5 - 4.5 mg/dL) 3.1 
 
  Magnesium (1.6 - 2.3 mg/dL) 1.7 
 
Hematology  
 
  CBC w Diff NO MAN DIFF REQ 
 
  WBC (4.8 - 10.8 /CUMM) 11.3  H 13.6  H
 
  RBC (4.20 - 5.40 /CUMM) 3.85  L 3.92  L
 
  Hgb (12.0 - 16.0 G/DL) 11.3  L 11.6  L
 
  Hct (37 - 47 %) 34.6  L 35.0  L
 
  MCV (81.0 - 99.0 FL) 89.8 89.3
 
  MCH (27.0 - 31.0 PG) 29.5 29.5
 
  MCHC (33.0 - 37.0 G/DL) 32.8  L 33.0
 
  RDW (11.5 - 14.5 %) 14.3 13.7
 
  Plt Count (130 - 400 /CUMM) 557  H 568  H
 
  MPV (7.4 - 10.4 FL) 7.9 7.7
 
  Gran % (42.2 - 75.2 %) 58.6 61.5
 
  Lymphocytes % (20.5 - 51.1 %) 14.9  L 15.2  L
 
  Monocytes % (1.7 - 9.3 %) 8.8 3.9
 
  Eosinophils % (0 - 5 %) 17.4  H 18.9  H
 
  Basophils % (0.0 - 2.0 %) 0.3 0.5
 
  Absolute Granulocytes (1.4 - 6.5 /CUMM) 6.6  H 8.4  H
 
  Absolute Lymphocytes (1.2 - 3.4 /CUMM) 1.7 2.1
 
  Absolute Monocytes (0.10 - 0.60 /CUMM) 1.0  H 0.5
 
  Absolute Eosinophils (0.0 - 0.7 /CUMM) 2.0 2.6
 
  Absolute Basophils (0.0 - 0.2 /CUMM) 0 0.1
 
 
 
 
 06/25 06/25
 
 0758 0145
 
Chemistry  
 
  Sodium (137 - 145 mmol/L) 137 
 
  Potassium (3.5 - 5.1 mmol/L) 3.4  L 
 
  Chloride (98 - 107 mmol/L) 105 
 
  Carbon Dioxide (22 - 30 mmol/L) 25 
 
  Anion Gap (5 - 16) 7 
 
  BUN (7 - 17 mg/dL) 11 
 
  Creatinine (0.5 - 1.0 mg/dL) 0.7 
 
  Estimated GFR (>60 ml/min) > 60 
 
  BUN/Creatinine Ratio (7 - 25 %) 15.7 
 
  Hemoglobin A1c (4.2 - 5.8 %) 5.6 
 
  Magnesium (1.6 - 2.3 mg/dL) 1.7 
 
Hematology  
 
  CBC w Diff MAN DIFF ORDERED 
 
  WBC (4.8 - 10.8 /CUMM) 14.1  H 
 
  RBC (4.20 - 5.40 /CUMM) 3.79  L 
 
  Hgb (12.0 - 16.0 G/DL) 11.4  L 
 
  Hct (37 - 47 %) 34.0  L 
 
  MCV (81.0 - 99.0 FL) 89.9 
 
  MCH (27.0 - 31.0 PG) 30.0 
 
  MCHC (33.0 - 37.0 G/DL) 33.3 
 
  RDW (11.5 - 14.5 %) 14.1 
 
  Plt Count (130 - 400 /CUMM) 594  H 
 
  MPV (7.4 - 10.4 FL) 7.8 
 
  Gran % (42.2 - 75.2 %) 59.5 
 
  Lymphocytes % (20.5 - 51.1 %) 13.4  L 
 
  Monocytes % (1.7 - 9.3 %) 8.5 
 
  Eosinophils % (0 - 5 %) 18.5  H 
 
  Basophils % (0.0 - 2.0 %) 0.1 
 
  Absolute Granulocytes (1.4 - 6.5 /CUMM) 8.4  H 
 
  Segmented Neutrophils (42.2 - 75.2 %) 46 
 
  Band Neutrophils (0.0 - 5.0 %) 10  H 
 
  Absolute Lymphocytes (1.2 - 3.4 /CUMM) 1.9 
 
  Lymphocytes (20.5 - 51.1 %) 16  L 
 
  Monocytes (1.7 - 9.3 %) 7 
 
  Absolute Monocytes (0.10 - 0.60 /CUMM) 1.2  H 
 
  Eosinophils (0 - 5.0 %) 21  H 
 
  Absolute Eosinophils (0.0 - 0.7 /CUMM) 2.6 
 
  Absolute Basophils (0.0 - 0.2 /CUMM) 0 
 
  Platelet Estimate (ADEQUATE) INCREASED 
 
  Normochromic RBCs VERIFIED 
 
  Basophilic Stippling SLIGHT 
 
  Anisocytosis 1+ 
 
Urines  
 
  Urine Color (YEL,AMB,STR)  KANE
 
  Urine Clarity (CLEAR)  CLEAR
 
  Urine pH (5.0 - 8.0)  6.0
 
  Ur Specific Gravity (1.001 - 1.035)  1.015
 
  Urine Protein (NEG,<30 MG/DL)  TRACE  H
 
  Urine Ketones (NEG)  40  H
 
  Urine Nitrite (NEG)  NEG
 
  Urine Bilirubin (NEG)  NEG
 
  Urine Urobilinogen (0.1  -  1.0 EU/dl)  1.0
 
  Ur Leukocyte Esterase (NEG)  NEG
 
  Ur Microscopic  SEDIMENT EXAMINED
 
  Urine RBC (0 - 5 /HPF)  3-5
 
  Urine WBC (0 - 2 /HPF)  5-10  H
 
  Ur Epithelial Cells (NONE,FEW)  FEW
 
  Urine Bacteria (NEG/NONE)  MANY  H
 
  Urine Hemoglobin (NEG)  SMALL  H
 
  Urine Glucose (N MG/DL)  NEG
 
 
 
 
 06/24 06/24 06/24 06/24
 
 2105 1915 1715 1430
 
Chemistry    
 
  Sodium (137 - 145 mmol/L)    138
 
  Potassium (3.5 - 5.1 mmol/L)    3.9
 
  Chloride (98 - 107 mmol/L)    100
 
  Carbon Dioxide (22 - 30 mmol/L)    26
 
  Anion Gap (5 - 16)    12
 
  BUN (7 - 17 mg/dL)    16
 
  Creatinine (0.5 - 1.0 mg/dL)    0.8
 
  Estimated GFR (>60 ml/min)    > 60
 
  BUN/Creatinine Ratio (7 - 25 %)    20.0
 
  Glucose (65 - 99 mg/dL)    144  H
 
  Lactic Acid (0.7 - 2.1 mmol/L) 0.9  1.1 2.8  H
 
  Calcium (8.4 - 10.2 mg/dL)    8.8
 
  Iron (37 - 170 ug/dL)    38
 
  TIBC (265 - 497 ug/dL)    220  L
 
  Ferritin (11.1 - 264 ng/mL)    89.9
 
  Total Bilirubin (0.2 - 1.3 mg/dL)    0.5
 
  AST (14 - 36 U/L)    32
 
  ALT (9 - 52 U/L)    38
 
  Alkaline Phosphatase (<127 U/L)    118
 
  Troponin I (< 0.11 ng/ml)    < 0.01
 
  C-Reactive Prot, Quant (<1.0 mg/dL)    1.9  H
 
  Total Protein (6.3 - 8.2 g/dL)    5.8  L
 
  Albumin (3.5 - 5.0 g/dL)    2.8  L
 
  Globulin (1.9 - 4.2 gm/dL)    3.0
 
  Albumin/Globulin Ratio (1.1 - 2.2 %)    0.9  L
 
  Amylase (30 - 110 U/L)    31
 
  Lipase (23 - 300 U/L)    23
 
Hematology    
 
  CBC w Diff    NO MAN DIFF REQ
 
  WBC (4.8 - 10.8 /CUMM)    11.2  H
 
  RBC (4.20 - 5.40 /CUMM)    4.57
 
  Hgb (12.0 - 16.0 G/DL)    13.4
 
  Hct (37 - 47 %)    40.6
 
  MCV (81.0 - 99.0 FL)    88.9
 
  MCH (27.0 - 31.0 PG)    29.3
 
  MCHC (33.0 - 37.0 G/DL)    33.0
 
  RDW (11.5 - 14.5 %)    14.0
 
  Plt Count (130 - 400 /CUMM)    632  H
 
  MPV (7.4 - 10.4 FL)    7.4
 
  Gran % (42.2 - 75.2 %)    73.0
 
  Lymphocytes % (20.5 - 51.1 %)    14.5  L
 
  Monocytes % (1.7 - 9.3 %)    4.5
 
  Eosinophils % (0 - 5 %)    7.9  H
 
  Basophils % (0.0 - 2.0 %)    0.1
 
  Absolute Granulocytes (1.4 - 6.5 /CUMM)    8.2  H
 
  Absolute Lymphocytes (1.2 - 3.4 /CUMM)    1.6
 
  Absolute Monocytes (0.10 - 0.60 /CUMM)    0.5
 
  Absolute Eosinophils (0.0 - 0.7 /CUMM)    0.9
 
  Absolute Basophils (0.0 - 0.2 /CUMM)    0
 
  ESR Westergren (0 - 20 MM)  30  H

## 2018-06-27 VITALS — DIASTOLIC BLOOD PRESSURE: 64 MMHG | SYSTOLIC BLOOD PRESSURE: 102 MMHG

## 2018-06-27 LAB
ABSOLUTE GRANULOCYTE CT: 9.4 /CUMM (ref 1.4–6.5)
BASOPHILS # BLD: 0.1 /CUMM (ref 0–0.2)
BASOPHILS NFR BLD: 0.5 % (ref 0–2)
EOSINOPHIL # BLD: 1.8 /CUMM (ref 0–0.7)
EOSINOPHIL NFR BLD: 12.5 % (ref 0–5)
ERYTHROCYTE [DISTWIDTH] IN BLOOD BY AUTOMATED COUNT: 14.4 % (ref 11.5–14.5)
GRANULOCYTES NFR BLD: 63.9 % (ref 42.2–75.2)
HCT VFR BLD CALC: 35.8 % (ref 37–47)
LYMPHOCYTES # BLD: 2.3 /CUMM (ref 1.2–3.4)
MCH RBC QN AUTO: 29.5 PG (ref 27–31)
MCHC RBC AUTO-ENTMCNC: 32.9 G/DL (ref 33–37)
MCV RBC AUTO: 89.9 FL (ref 81–99)
MONOCYTES # BLD: 1.1 /CUMM (ref 0.1–0.6)
PLATELET # BLD: 579 /CUMM (ref 130–400)
PMV BLD AUTO: 7.8 FL (ref 7.4–10.4)
RED BLOOD CELL CT: 3.98 /CUMM (ref 4.2–5.4)
WBC # BLD AUTO: 14.6 /CUMM (ref 4.8–10.8)

## 2018-06-27 NOTE — PN- STUDENT
Subjective
Subjective:
Student H&P
 
Source: Patient and Records
Reliability: Nominal
HPI: 
Mrs. Perez is a pleasant 77 YOF who presented in the ER with lower abdominal 
pain and diarrhea of 2 weeks duration that has recently worsened and now 
includes nausea and vomitting. She localizes the pain to both lower quadrants, 
denies radiation, and describes it as a 10/10 stabbing or cramping pain that is 
partially relieved when she has a bowel movement. The episodes of diarhea happen
about 4-5 times a day, and she reports that there is sometimes bright red blood 
in the toilet and often finds it on the toilet paper after wiping. Prior to this
illness, the patient often suffered from constipation, which she treated with 
Dulcolax. The recent emmesis has been free of blood or coffee grounds, but she 
has been too nauseous to keep any food down for the past two days. An unclear 
number of days (patient gave conflicting stories; between 1-3 weeks) before 
presenting to the ER, the patient went to her GI doctor with similar symptoms. A
clinical diagnosis of diverticulitis was made, and the patient was prescribed 
oral antibiotics and scheduled for a CT. Patient took two days worth of 
antibiotics, but became unable to tolerate PO meds. She came to the ER due to 
worsening symtoms before the scheduled CT. At her time of presentation, the 
patient denied any sick contacts, strange foods, or recent travel. She denied 
fevers/chills, skin rashes, joint pains, weight loss, chest pain, dyspnea, or 
presyncopal symptoms. On later interviews, the patient did recall eating 
[employer] food at an outdoor food festival in the recent past. She reported 
that her last colonoscopy was 7 years ago and "they found nothing". On obtaining
old medical records, it was noted that poor bowel prep made visualization 
difficult and prevented advancement of the scope beyond the sigmoid colon. 
 
PMH: HLD, PUD without H. pylori, Constipation, Depression
 
Home Meds:
Omeprazole
Paroxitine
Pravastatin
 
FH: 
Mother: ovarian cancer
Sister: lung cancer
Sister: breast cancer
Aunt: pancreatic cancer
 
SH:
Remote 30 pack-year history of smoking; quit 40 years ago. Denies alcohol or 
illicit drug use
 
 
 
Objective
Objective:
On physical exam, the patient was consistenly afebrile and vitally stable. She 
appeared uncomfortable lying in bed. She was alert and oriented, giving 
appropriate responses to questions and speaking in full sentances. Her skin was 
PWD with numerous benign appearing moles. Mucous membranes were dry. Heart had a
regular rate and rhythm with a normal S1/S2 and no MRG. Lungs were clear to 
auscultation bilaterally. Abdomen was distended but soft with tenderness to 
light palpation in the epigastrium and RLQ. Distal pulses were symetrical and 
legs were free of edema. 
 
Laboratory workup revealed a leukocytosis, which was sustained over the course 
of her stay and only improved slightly with treatment. On her second day, a 
relative and absolute eosinophilia developed. Stool cultures were polymicrobial 
without shigella toxins. C.diff toxin was negative. Repeated attempts were made 
to get stool ova and parasite testing done, but were delayed and ultimately 
unsuccessful due to laboratoy/meditech error and insuficient samples. 
 
Imaging studies included a CT of the abdomen and pelvis showed: 
"Diffuse colonic wall thickening with pericolonic inflammatory stranding and
small amount of ascites. The findings are concerning for
infectious/laboratory colitis. There is liquid stool within the distal colon.
No evidence of perforation.
A nonspecific 2.7 cm lesion seen in the region of the gastric on this and
may represent a gastric diverticulum or alternatively could be related to the
adrenal gland. Attention to this lesion is recommended at the time of
follow-up imaging.
Multiple pulmonary nodules at the lung bases the largest on the left
measuring up to 9 mm. Metastatic disease cannot be excluded.
Fibroid uterus and left adnexal cyst."
 
 
 Laboratory Tests
 
 
 06/27
 
 0612
 
Chemistry 
 
  Sodium (137 - 145 mmol/L) 136  L
 
  Potassium (3.5 - 5.1 mmol/L) 3.8
 
  Chloride (98 - 107 mmol/L) 104
 
  Carbon Dioxide (22 - 30 mmol/L) 24
 
  Anion Gap (5 - 16) 8
 
  BUN (7 - 17 mg/dL) 7
 
  Creatinine (0.5 - 1.0 mg/dL) 0.7
 
  Estimated GFR (>60 ml/min) > 60
 
  BUN/Creatinine Ratio (7 - 25 %) 10.0
 
  Magnesium (1.6 - 2.3 mg/dL) 1.6
 
Hematology 
 
  CBC w Diff NO MAN DIFF REQ
 
  WBC (4.8 - 10.8 /CUMM) 14.6  H
 
  RBC (4.20 - 5.40 /CUMM) 3.98  L
 
  Hgb (12.0 - 16.0 G/DL) 11.8  L
 
  Hct (37 - 47 %) 35.8  L
 
  MCV (81.0 - 99.0 FL) 89.9
 
  MCH (27.0 - 31.0 PG) 29.5
 
  MCHC (33.0 - 37.0 G/DL) 32.9  L
 
  RDW (11.5 - 14.5 %) 14.4
 
  Plt Count (130 - 400 /CUMM) 579  H
 
  MPV (7.4 - 10.4 FL) 7.8
 
  Gran % (42.2 - 75.2 %) 63.9
 
  Lymphocytes % (20.5 - 51.1 %) 15.9  L
 
  Monocytes % (1.7 - 9.3 %) 7.2
 
  Eosinophils % (0 - 5 %) 12.5  H
 
  Basophils % (0.0 - 2.0 %) 0.5
 
  Absolute Granulocytes (1.4 - 6.5 /CUMM) 9.4  H
 
  Absolute Lymphocytes (1.2 - 3.4 /CUMM) 2.3
 
  Absolute Monocytes (0.10 - 0.60 /CUMM) 1.1  H
 
  Absolute Eosinophils (0.0 - 0.7 /CUMM) 1.8
 
  Absolute Basophils (0.0 - 0.2 /CUMM) 0.1
 
 
Vital Signs
 
 
Date Time Temp Pulse Resp B/P B/P Pulse O2 O2 Flow FiO2
 
     Mean Ox Delivery Rate 
 
06/27 0800       Room Air  
 
06/27 0624 98.7 90 20 102/64  94 Room Air  
 
06/26 2220 98.5 97 18 108/64  92   
 
06/26 1409 98.4 85 18 114/82  98 Room Air  
 
 
 
 
Assessment/Plan
Assessment:
77 YOF with a PMH of PUD and Constipation presenting with acute diarrhea and 
abdominal pain accompanied by nausea, vommiting, and poor oral intake with signs
of colitis on CT with incidental lung findings all in the setting of sustained 
eosinophilia and a strong family history of cancer. 
 
Ddx - parasitic colitis, diverticulitis, ischemic colitis, colon cancer, 
lymphoma. Other potential causes such as Crohn's, Wegener's, Polyateritis nodosa
, and Eosinophilic granuloma are rare and do not quite fit the clinical picture.
 
 
Ischemic colitis seems unlikely due to the lack of blood clots in the stool as 
well as the large portion of colon which is inflamed, rather than just watershed
areas. Diverticulitis fits the clinical picture, but no diverticuli were seen on
CT. 
 
Two theories which unify all of the findings are a parasitic process, some of 
which can present with pulmonary findings -and all of which are relatively rare-
or a neoplastic process with metastatic disease. Pain is an uncommon finding 
with colon cancer, but the strong family history of cancer is concerning. 
 
Discharge Plan:
Follow-up stool ova and parasite testing
Follow-up with GI in 2-3 weeks for colonoscopy; make physician aware of poor 
prep history
Follow-up with Pulmonology 2-3 to investigate lung nodules
Continue current course of antibiotics until completion
Instruct patient to maintain good hydration

## 2018-06-27 NOTE — PN- HOUSESTAFF
**See Addendum**
Subjective
Follow-up For:
colitis, diarrhea
Subjective:
patient did well with dinner, abdominal pain improved
afebrile on ceftriaxone/flagyl
one episode of diarrhea this morning, 2 loose BMs yesterday
stool studies sent, c diff negative
 
Review of Systems
Constitutional:
Reports: see HPI. 
 
Objective
Last 24 Hrs of Vital Signs/I&O
 Vital Signs
 
 
Date Time Temp Pulse Resp B/P B/P Pulse O2 O2 Flow FiO2
 
     Mean Ox Delivery Rate 
 
 06 98.7 90 20 102/64  94 Room Air  
 
 2220 98.5 97 18 108/64  92   
 
 1409 98.4 85 18 114/82  98 Room Air  
 
 
 Intake & Output
 
 
  1600  0800  0000
 
Intake Total  360 71
 
Output Total   
 
Balance  360 71
 
    
 
Intake, IV   21
 
Intake, Oral  360 50
 
Patient  63.56 kg 
 
Weight   
 
 
 
 
Physical Exam
General Appearance: Alert, Oriented X3, Cooperative, No Acute Distress
Cardiovascular: Regular Rate, Normal S1, Normal S2, No Murmurs
Lungs: Clear to Auscultation, Normal Air Movement
Abdomen: Normal Bowel Sounds, Soft, No Tenderness, No Masses
Extremities: No Clubbing, No Cyanosis, No Edema, Normal Pulses
Current Medications:
 Current Medications
 
 
  Sig/Maria E Start time  Last
 
Medication Dose Route Stop Time Status Admin
 
Acetaminophen 650 MG Q6P PRN  1815 AC 
 
  PO   0747
 
Calcium Carbonate 1,250 MG BID  1420 DC 
 
  PO  [id number]
 
Ceftriaxone Sodium 1,000 MG Q24H  0900 AC 
 
  IV   1028
 
Cholecalciferol 1,000 IU DAILY  0900 AC 
 
  PO   1029
 
Cyanocobalamin 1,000 MCG Q48  0900 AC 
 
  PO   0827
 
Heparin Sodium  5,000 UNIT Q8  1724 AC 
 
(Porcine)  SC   0536
 
Magnesium Oxide 400 MG ONE ONE  1420 DC 
 
  PO  1421  1620
 
Metronidazole 500 MG Q8H  1800 AC 
 
N/A 1 UNIT IV   0106
 
Metronidazole 500 MG IQ8  0000 DC 
 
N/A 1 UNIT IV   1028
 
Morphine Sulfate 4 MG Q6P PRN  1030 AC 
 
  IV   2019
 
Ondansetron HCl 4 MG Q6P PRN  0745 AC 
 
  IV   1730
 
Paroxetine HCl 20 MG DAILY  0900 AC 
 
  PO   1029
 
Patient Medication  1 ED ONE ONE  1530 DC 
 
Teaching  ED  1531  1620
 
Potassium Chloride 20 MEQ ONCE ONE  0930 DC 
 
  PO  0931  1029
 
Pravastatin Sodium 20 MG 1700  1700 AC 
 
  PO   1620
 
 
 
 
Last 24 Hrs of Lab/John Results
Last 24 Hrs of Labs/Mics:
 Laboratory Tests
 
18 0612:
Anion Gap 8, Estimated GFR > 60, BUN/Creatinine Ratio 10.0, Magnesium 1.6, CBC w
Diff NO MAN DIFF REQ, RBC 3.98  L, MCV 89.9, MCH 29.5, MCHC 32.9  L, RDW 14.4, 
MPV 7.8, Gran % 63.9, Lymphocytes % 15.9  L, Monocytes % 7.2, Eosinophils % 12.5
 H, Basophils % 0.5, Absolute Granulocytes 9.4  H, Absolute Lymphocytes 2.3, 
Absolute Monocytes 1.1  H, Absolute Eosinophils 1.8, Absolute Basophils 0.1
 Microbiology
 1300  STOOL: Cryptosporidium Antigen - RECD
 1300  STOOL: Giardia Antigen (JOHN) - RECD
 
 
 
Assessment/Plan
Assessment:
77 year old female with HLD, PUD s/p EGD tx'd with PPI, doesn't recall being 
treated with antibiotics for Hpylori, depression, former 30pack-year smoker quit
40 years ago, and a remote history of colitis (uncertain tx), with last 
colonoscopy and EGD 7 years ago, reportedly no polyps, evaluated by Dr. Yolis Schwab in [place] presented with complaints of abdominal pain, nausea, 
vomiting, and diarrhea.
 
Colitis:
 N/V/D, abdominal pain with leukocytosis and CT finding of colonic thickening/
inflammation
 Gastroenterology consulted, possibly ischemic colitis vs infectious colitis
 Crypto/giardia pending, C diff and Shiga toxin negative, culture polymicrobial
 Last colonoscopy 7 years ago, poor prep
 Tolerated regular diet yesterday
 Discharge for total ten day course on ciprofloxacin and metronidazole + miralax
 Will need follow up colonoscopy in 6-8 weeks with Dr. Rosenthal
 Significantly elevated eosinophils on complete blood count, repeat CBC as an 
outpatient
 
Lung nodules:
 Outpatient pulmnology referral for further evaluation
 May need PET scan as an outpatient
 Abdominal CT detected numerous noncalcified nodules involving the bilateral
lower lungs largest in the left lower lobe measuring up to 9 mm
 
HLD:
 Continue pravastatin
Depression:
 Continue paxil
 
Tolerating regular diet
DVT ppx-heparin sc and ALPs
DNR/DNI
Stable for discharge on oral antibiotics, will need gastroenterology follow up 
for colonoscopy in 6-8 weeks, referral given for Dr. Rosenthal
Problem List:
 1. Colitis
 
Pain Ratin
Pain Location:
RLQ
Pain Goal: Pain 4 or less
Pain Plan:
tylenol on discharge
Tomorrow's Labs & Rationales:
none

## 2018-08-20 ENCOUNTER — HOSPITAL ENCOUNTER (INPATIENT)
Dept: HOSPITAL 68 - ERH | Age: 78
LOS: 2 days | DRG: 812 | End: 2018-08-22
Attending: INTERNAL MEDICINE | Admitting: HOSPITALIST
Payer: COMMERCIAL

## 2018-08-20 VITALS — HEIGHT: 63 IN | BODY MASS INDEX: 21.44 KG/M2 | WEIGHT: 121 LBS

## 2018-08-20 VITALS — SYSTOLIC BLOOD PRESSURE: 100 MMHG | DIASTOLIC BLOOD PRESSURE: 54 MMHG

## 2018-08-20 DIAGNOSIS — K21.9: ICD-10-CM

## 2018-08-20 DIAGNOSIS — D64.9: Primary | ICD-10-CM

## 2018-08-20 DIAGNOSIS — Z79.52: ICD-10-CM

## 2018-08-20 DIAGNOSIS — Z87.891: ICD-10-CM

## 2018-08-20 DIAGNOSIS — Z95.9: ICD-10-CM

## 2018-08-20 DIAGNOSIS — Z79.01: ICD-10-CM

## 2018-08-20 DIAGNOSIS — Z86.711: ICD-10-CM

## 2018-08-20 DIAGNOSIS — F32.9: ICD-10-CM

## 2018-08-20 DIAGNOSIS — D62: ICD-10-CM

## 2018-08-20 DIAGNOSIS — E78.5: ICD-10-CM

## 2018-08-20 DIAGNOSIS — I95.89: ICD-10-CM

## 2018-08-20 DIAGNOSIS — K51.90: ICD-10-CM

## 2018-08-20 LAB
ABSOLUTE GRANULOCYTE CT: 4.8 /CUMM (ref 1.4–6.5)
APTT BLD: 30 SEC (ref 25–37)
BASOPHILS # BLD: 0 /CUMM (ref 0–0.2)
BASOPHILS NFR BLD: 0.5 % (ref 0–2)
EOSINOPHIL # BLD: 0.1 /CUMM (ref 0–0.7)
EOSINOPHIL NFR BLD: 1.1 % (ref 0–5)
ERYTHROCYTE [DISTWIDTH] IN BLOOD BY AUTOMATED COUNT: 18.7 % (ref 11.5–14.5)
GRANULOCYTES NFR BLD: 68.2 % (ref 42.2–75.2)
HCT VFR BLD CALC: 20.3 % (ref 37–47)
LYMPHOCYTES # BLD: 1.8 /CUMM (ref 1.2–3.4)
MCH RBC QN AUTO: 29 PG (ref 27–31)
MCHC RBC AUTO-ENTMCNC: 32.2 G/DL (ref 33–37)
MCV RBC AUTO: 90.1 FL (ref 81–99)
MONOCYTES # BLD: 0.4 /CUMM (ref 0.1–0.6)
PLATELET # BLD: 822 /CUMM (ref 130–400)
PMV BLD AUTO: 7.3 FL (ref 7.4–10.4)
PROTHROMBIN TIME: 18.1 SEC (ref 9.4–12.5)
RED BLOOD CELL CT: 2.25 /CUMM (ref 4.2–5.4)
WBC # BLD AUTO: 7.1 /CUMM (ref 4.8–10.8)

## 2018-08-20 PROCEDURE — 2NASP: CPT

## 2018-08-20 PROCEDURE — 30233N1 TRANSFUSION OF NONAUTOLOGOUS RED BLOOD CELLS INTO PERIPHERAL VEIN, PERCUTANEOUS APPROACH: ICD-10-PCS | Performed by: INTERNAL MEDICINE

## 2018-08-20 PROCEDURE — P9016 RBC LEUKOCYTES REDUCED: HCPCS

## 2018-08-20 NOTE — ED GENERAL ADULT
History of Present Illness
 
General
Chief Complaint: General Adult
Stated Complaint: SIB DR BLOUNT FOR BLOOD TRANSFUSION? WEAKNESS
Source: patient, family, old records
Exam Limitations: no limitations
 
Vital Signs & Intake/Output
Vital Signs & Intake/Output
 Vital Signs
 
 
Date Time Temp Pulse Resp B/P B/P Pulse O2 O2 Flow FiO2
 
     Mean Ox Delivery Rate 
 
 1815 97.9 84 18 98/51  96 Room Air  
 
 1802 97.6 76 18 154/76  95 Room Air  
 
 1758 98.3 87 18 103/57  98 Room Air  
 
 1637 98.1 91 18 90/48  95 Room Air  
 
 1542       Room Air  
 
 1501 97.9 108 15 102/69  92 Room Air Room Air 
 
 
 
Allergies
Coded Allergies:
No Known Allergies (18)
 
Reconcile Medications
Apixaban (Eliquis) 5 MG TABLET   1 TAB PO BID PE
     .
Cholecalciferol (Vitamin D3) (Vitamin D) 1,000 UNIT TABLET   1 TAB PO DAILY 
SUPPLEMENT  (Reported)
Cyanocobalamin (Vitamin B-12) 1,000 MCG TABLET   1 TAB PO EOD SUPPLEMENT  (
Reported)
Mesalamine (Delzicol) 400 MG CAPSULE.DR   4 TAB PO TID IBD
     .
Mesalamine W/Cleansing Wipes (Rowasa 4 Gm/60 Ml Enema Kit) 4 GRAM/60 ML 
ENEMA.KIT   4 GM OK AT BEDTIME IBD
     .
Omeprazole Magnesium (Prilosec Otc) 20 MG TABLET.DR   1 TAB PO DAILY peptic 
ulcer disease  (Reported)
Paroxetine HCl 20 MG TABLET   1 TAB PO DAILY MENTAL HEALTH  (Reported)
Polyethylene Glycol 3350 (Miralax) 17 GRAM POWD.PACK   1 PAC PO DAILY 
constipation
     dissolve in water
Pravastatin Sodium 20 MG TABLET   1 TAB PO DAILY CHOLESTEROL  (Reported)
Prednisone 10 MG TABLET   1 TAB PO SEE INSTRUCT IBD
     TAKE 6/DAY FOR -
     TAKE 5/DAY FOR -
     TAKE 4/DAY UNTIL YOU SEE DR. GRIJALVA.
 
Triage Note:
PT SENT TO ED BY DR. BLOUNT FOR LOW BLOOD COUNTS
 AND SEVERE WEAKNESS. PT NOTED TO BE PALE IN
 TRIAGE. 102/69.  DENIES DARK COLORED STOOLS. PT IS
 ON A BLOOD THINNER.
Triage Nurses Notes Reviewed? yes
Onset: Gradual
Duration: day(s):
Timing: recent history
Injury Environment: home
Severity: moderate
HPI:
77-year-old female with history of colitis, PE/DVT on requests presents 
emergency department sent in by Dr. Blount for about H/H.  Patient states that
for the past few days to 1 week she has had progressive weakness and fatigue.  
She reports feeling tired trying to do normal day-to-day functions.  Patient 
also reports dyspnea on exertion.  Her  reports that she appears pale.  
Patient has been followed by Dr. Blount for colitis, she was seen in the 
office a few days ago and labs today showed low H/H, patient was recommended she
come here for blood transfusion.  Patient was admitted in the hospital last 
month at which time she had blood in her stool.  Patient states that recently 
she has had normal bowel movements, no melena or bright red blood.  She denies 
chest pain, abdominal pain, diarrhea.
 
Past History
 
Travel History
Traveled to [place] past 21 day No
 
Medical History
Any Pertinent Medical History? see below for history
Neurological: NONE
EENT: NONE
Cardiovascular: hyperlipidemia, HYPOTENSION
Respiratory: NONE
Gastrointestinal: GERD, "colitis" 40years ago. No treatment
Hepatic: NONE
Renal: NONE
Musculoskeletal: NONE
Psychiatric: depression
Endocrine: NONE
Blood Disorders: NONE
Cancer(s): NONE
GYN/Reproductive: NONE
History of MRSA: No
History of VRE: No
History of CDIFF: No
 
Surgical History
Surgical History: appendectomy, TONSILLECTOMY
 
Psychosocial History
Who do you live with Spouse
What is your primary language English
Tobacco Use: Quit >30 days ago
 
Family History
Family History, If Any:
MOTHER
  FH: ovarian cancer
SISTER
  FH: lung cancer
SISTER
  FH: breast cancer
aunt
  FH: pancreatic cancer
 
Hx Contributory? No
 
Review of Systems
 
Review of Systems
Constitutional:
Reports: see HPI. 
EENTM:
Reports: no symptoms. 
Respiratory:
Reports: see HPI. 
Cardiovascular:
Reports: no symptoms. 
GI:
Reports: see HPI. 
Genitourinary:
Reports: no symptoms. 
Musculoskeletal:
Reports: no symptoms. 
Skin:
Reports: no symptoms. 
Neurological/Psychological:
Reports: no symptoms. 
Hematologic/Endocrine:
Reports: see HPI. 
Immunologic/Allergic:
Reports: no symptoms. 
All Other Systems: Reviewed and Negative
 
Physical Exam
 
Physical Exam
General Appearance: well developed/nourished, no apparent distress, alert, awake
Head: atraumatic, normal appearance
Eyes:
Bilateral: normal appearance. 
Ears, Nose, Throat: hearing grossly normal
Neck: normal inspection, supple, full range of motion
Respiratory: normal breath sounds, no respiratory distress, lungs clear
Cardiovascular: tachycardia
Gastrointestinal: normal bowel sounds, soft, non-tender, no organomegaly
Rectal: normal exam, heme negative stool
Back: normal inspection, normal range of motion
Extremities: normal inspection
Neurologic/Psych: awake, alert, oriented x 3
Skin: warm/dry, pallor
 
Core Measures
ACS in differential dx? No
CVA/TIA Diagnosis: No
Sepsis Present: No
Sepsis Focused Exam Completed? No
 
Progress
Differential Diagnoses
I considered the following diagnoses in my evaluation of the patient: [Acute 
blood loss anemia, symptomatic anemia, GI bleeding, iron deficiency]
 
Plan of Care:
 Orders
 
 
Procedure Date/time Status
 
Pathway - chart 1807 Active
 
Patient Data  180 Active
 
BLOOD PRODUCT PICKUP  180 Active
 
LEUKOCYTE POOR (PACKED CELLS)  180 Active
 
TYPE & SCREEN (NOT X-MATCH)  1756 Active
 
Patient Data  1751 Active
 
EKG  1630 Active
 
Add-on Test (ER Only)  1612 Active
 
LEUKOCYTE POOR (PACKED CELLS)  1538 Active
 
FERRITIN  1529 Complete
 
MISTAKE  1452 Active
 
URINALYSIS  1452 Complete
 
TROPONIN LEVEL  1452 Complete
 
PARTIAL THROMBOPLASTIN TIME  1452 Complete
 
PROTHROMBIN TIME  1452 Complete
 
COMPREHENSIVE METABOLIC PANEL  1452 Complete
 
CBC WITHOUT DIFFERENTIAL  1452 Complete
 
TYPE & SCREEN (NOT X-MATCH)  1452 Active
 
Saline Lock   UNK Active
 
VTE Mechanical Prophylaxis   UNK Active
 
Vital Signs   UNK Active
 
Intake & Output   UNK Active
 
Hemoccult   UNK Active
 
Activity/Ambulation   UNK Active
 
 
 Current Medications
 
 
  Sig/Maria E Start time  Last
 
Medication Dose  Stop Time Status Admin
 
Pantoprazole Sodium 40 MG DAILY  1815 UNVr 
 
(Protonix)     
 
Sodium Chloride 1,000 ML .Q10H  1815 UNVr 
 
(Normal Saline 0.9%)    1414  
 
 
 Laboratory Tests
 
 
 
18 1755:
Urine Color YEL, Urine Clarity CLEAR, Urine pH 6.5, Ur Specific Gravity 1.010, 
Urine Protein NEG, Urine Ketones NEG, Urine Nitrite NEG, Urine Bilirubin NEG, 
Urine Urobilinogen 0.2, Ur Leukocyte Esterase NEG, Ur Microscopic EXAM NOT 
REQUIRED, Urine Hemoglobin NEG, Urine Glucose NEG
 
18 1529:
Anion Gap 7, Estimated GFR > 60, BUN/Creatinine Ratio 38.3  H, Glucose 173  H, 
Calcium 8.7, Ferritin 26.0, Total Bilirubin 0.4, AST 44  H, ALT 38, Alkaline 
Phosphatase 93, Troponin I < 0.01, Total Protein 5.5  L, Albumin 2.7  L, 
Globulin 2.8, Albumin/Globulin Ratio 1.0  L, PT 18.1  H, INR 1.65  H, APTT 30, 
CBC w Diff NO MAN DIFF REQ, RBC 2.25  L, MCV 90.1, MCH 29.0, MCHC 32.2  L, RDW 
18.7  H, MPV 7.3  L, Gran % 68.2, Lymphocytes % 24.9, Monocytes % 5.3, 
Eosinophils % 1.1, Basophils % 0.5, Absolute Granulocytes 4.8, Absolute 
Lymphocytes 1.8, Absolute Monocytes 0.4, Absolute Eosinophils 0.1, Absolute 
Basophils 0
 
 
Patient's H/H here in the emergency department as well, trending down compared 
to previous study from earlier today.  Patient has symptoms living dyspnea, 
weakness, fatigue.  She consents to blood transfusion at this time.  Guaiac test
is negative at this time with brown stool.
 
Discussed this patient with Dr. Blount.  Given trending down H/H hospital 
admission is appropriate for blood transfusion and further workup regarding her 
possible bleeding in the setting of recent colitis with GI bleeding last month..
 
Case management recommended full Admission.
 
Spoke with hospitalist Dr. Nuñez regarding general medicine admission
Diagnostic Imaging:
Viewed by Me: Radiology Read.  Discussed w/RAD: Radiology Read. 
CXR Impression: PATIENT: DI SMITH  MEDICAL RECORD NO: [id number] PRESENT AGE:
77  PATIENT ACCOUNT NO: [id number] : 39  LOCATION: [facility] ORDERING PHYSICIAN:
Lucy VALDEZ     SERVICE DATE:  EXAM TYPE: RAD - XRY-
PORTABLE CHEST XRAY EXAMINATION: XR PORTABLE CHEST CLINICAL INFORMATION: 
Evaluate for congestion. Dyspnea. COMPARISON: Chest radiograph 2018. 
TECHNIQUE: Portable frontal view of the chest was obtained. FINDINGS: There are 
a few ill-defined opacities within lung bases that most likely represent a 
manifestation of subsegmental atelectasis. Scarring and/or atelectasis is also 
visualized within the right upper lobe. No overt consolidative disease or 
effusion. No pneumothorax. The cardiac silhouette and upper mediastinal contours
are normal. No acute osseous finding. IMPRESSION: There is mild bibasilar 
subsegmental atelectasis and platelike atelectasis and/or scar visualized within
the right upper lobe. No overt consolidative disease or effusion. DICTATED BY: 
Car Drew MD  DATE/TIME DICTATED:18 :
RAD.DEE  DATE/TIME TRANSCRIBED:18 CONFIDENTIAL, DO NOT COPY 
WITHOUT APPROPRIATE AUTHORIZATION.  <Electronically signed in Other Vendor 
System>                                                                         
              SIGNED BY: Car Drew MD 18
Initial ED EKG: sinus rhythm, low voltage, nonspecific ST changes
Prior EKG: unchanged (2018)
 
Departure
 
Departure
Disposition: STILL A PATIENT
Condition: Stable
Clinical Impression
Primary Impression: Symptomatic anemia
Referrals:
Dina rGijalva MD (PCP/Family)
 
Departure Forms:
Customer Survey
General Discharge Information
 
Admission Note
Spoke With:
Carmelo Nuñez MD
Documentation of Exam:
Documentation of any treatments & extenuating circumstances including Concerns 
Regarding Discharge (functional status, medication knowledge or non-compliance, 
living conditions, etc.) that warrant an admission rather than observation: [
Patient has symptomatic anemia with H&H trending down, tachycardia, history of 
colitis with possibility of GI bleeding requiring GI consult, blood transfusion,
IV fluids, premature discharge medically unsafe]
 
 
Critical Care Note
 
Critical Care Note
Critical Care Time: 30-74 min

## 2018-08-20 NOTE — DISCHARGE SUMMARY
**See Addendum**
Visit Information
 
Visit Dates
Admission Date:
08/20/18
 
Discharge Date:
08/22/18
 
Hospital Course
 
Course
Attending Physician:
Abdirashid Castañeda MD
 
Primary Care Physician:
Galo BARRIOS,[facility] Course:
78 YO female with a PMHx. of PE s/p IVC filter, HLD, GERD, Depression, and 
Ulcerative colitis who lasts was at [place] 7/12 -7/18 who today presents to the
ED c/o "weakness" for the past two days.
 
Labs on admission: 
Hgb 6.5, HCT 20.3, WBC 7.1, PLT Count 822
BUN 23 Cr 0.6 
Total Protein 5.5 Alb 2.7
 
 
Etiology in this case with a presentation of weakness x 2 days presenting with 
labile blood pressures without any pallor, tachycardia, and dyspnea with low H/H
in a patient with history of Ulcerative Colitis and Acute Pulmonary embolism (7/
13) is likely related to acute blood anemia secondary to GI losses.
 
1. Acute blood loss anemia
-Admitted to general medicine floor for observation and management of vitals. 
Pt. stabilized on admission without any complaints of fatigue, weakness, chills,
or lightheadedness; monitored patient for H/H level; patient progressed well 
with her H/H and was stable for discharge
-H/H low; crossmatched and typed; CBC every 8 hourly, transfused 2 units 
initially; follow up H/H 9.9/30.0
-GI consulted; recommended to follow vitals and if stable ready for discharge
-Held Eliquis; restarted as patient stable with improved H/H
-IV Protonix 40 mg
-Recent EGD + Flex sigmoidoscopy performed on 7/12; GI recommend to follow H/H 
and if stable ready for discharge and close follow-up as an outpatient; patient'
s H/H 10.1/30.7 stable from prior, patient discharged and told to follow up with
Dr. Rosenthal as an outpatient
 
2. Hypotension
-Checked orthostatic vitals
-Likely secondary to acute blood anemia; BP improved on subsequent rechecks
 
Allergies:
Coded Allergies:
No Known Allergies (08/20/18)
 
Significant Procedures:
Chest XRay - 
There is mild bibasilar subsegmental atelectasis and platelike atelectasis
and/or scar visualized within the right upper lobe. No overt consolidative
disease or effusion.
 
Pertinent Lab Results:
Last Hgb 10.1
Last Hct 30.7
 
Disposition Summary
 
Disposition
Principal Diagnosis:
Acute blood loss anemia
Additional Diagnosis:
Hypotension
Discharge Disposition: home or self care
 
Discharge Instructions
 
General Discharge Information
Code Status: Full Code
Patient's Diet:
Regular
Patient's Activity:
Resume normal activity as tolerated
Follow-Up Instructions/Appts:
Please follow up with PCP in one week.
 
Please follow up with Dr. Rosenthal (GI) and Dr. Cardoza (Pulmonary) within one 
week.
 
Please continue to take home medications as prescribed.
 
Medications at Discharge
Discharge Medications:
Continue taking these medications:
Pravastatin Sodium (Pravastatin Sodium) 20 MG TABLET
    1 Tablet ORAL DAILY
    Qty = 90
    Comments:
       Last Taken: 8/22/18
             Time: 0900AM
 
Paroxetine HCl (Paroxetine HCl) 20 MG TABLET
    1 Tablet ORAL DAILY
    Qty = 90
    Comments:
       Last Taken: 8/22/18
             Time: 0900AM
 
Cyanocobalamin (Vitamin B-12) 1,000 MCG TABLET
    1 Tablet ORAL Every other day
    Comments:
       Last Taken: 8/20/18
             Time: 1000 PM
 
Cholecalciferol (Vitamin D3) (Vitamin D) 1,000 UNIT TABLET
    1 Tablet ORAL DAILY
    Comments:
       Last Taken:8/22/18
             Time:0900
 
Omeprazole Magnesium (Prilosec Otc) 20 MG TABLET.
    1 Tablet ORAL DAILY
    Comments:
       Last Taken: 7/18/18
             Time: 0600AM
 
Polyethylene Glycol 3350 (Miralax) 17 GRAM POWD.PACK
    1 Packet ORAL DAILY
    Qty = 30
    Instructions:
       dissolve in water
    Comments:
       NOT GIVEN IN HOSPITAL
 
Apixaban (Eliquis) 5 MG TABLET
    1 Tablet ORAL TWICE DAILY
    Qty = 60
    Instructions:
       .
    Comments:
       Last Taken:8/22/18
             Time:0900
 
Mesalamine (Delzicol) 400 MG CAPSULE.DR
    4 Tablet ORAL THREE TIMES DAILY
    Qty = 120
    Instructions:
       .
    Comments:
       Last Taken: 8/22/18
             Time: 0900AM
 
Mesalamine W/Cleansing Wipes (Rowasa 4 Gm/60 Ml Enema Kit) 4 GRAM/60 ML 
ENEMA.KIT
    4 Gram RECTALLY AT BEDTIME
    Qty = 60
    Instructions:
       .
    Comments:
       Last Taken: 7/16/18
             Time:2300PM
 
 
Copies To:
Galo BARRIOS,Dina
 
Attending MD Review Statement
Documenting Attending:
Rajendra Castañeda MD
Other Findings:
The patient was seen on the day of discharge. H/H is stable. Agree with plan of 
care upon discharge. Follow-up H/H over next week and sooner if any symptoms.

## 2018-08-20 NOTE — RADIOLOGY REPORT
EXAMINATION:
XR PORTABLE CHEST
 
CLINICAL INFORMATION:
Evaluate for congestion. Dyspnea.
 
COMPARISON:
Chest radiograph 07/12/2018.
 
TECHNIQUE:
Portable frontal view of the chest was obtained.
 
FINDINGS:
There are a few ill-defined opacities within lung bases that most likely
represent a manifestation of subsegmental atelectasis. Scarring and/or
atelectasis is also visualized within the right upper lobe. No overt
consolidative disease or effusion. No pneumothorax. The cardiac silhouette
and upper mediastinal contours are normal. No acute osseous finding.
 
IMPRESSION:
There is mild bibasilar subsegmental atelectasis and platelike atelectasis
and/or scar visualized within the right upper lobe. No overt consolidative
disease or effusion.

## 2018-08-20 NOTE — HISTORY & PHYSICAL
Mao Patel 08/20/18 2775:
General Information and HPI
History of Present Illness:
Jose Smith is a 76 YO female with a PMHx. of PE s/p IVC filter, HLD, GERD, 
Depression, and Ulcerative colitis who lasts was at [place] 7/12 -7/18 who today
presents to the ED c/o "weakness" for the past two days. Patient states she has 
had feelings of weakness and low energy for the past two days. Patient expresses
that today she feels alright; however she had blood work this morning after 
which Dr. Grijalva told her to go to the ED because her blood count was low. 
Patient otherwise denies any diarrhea, constipation, fevers, chills, nausea, 
vomiting, chest pain, palpitations, falls, dizziness, and lightheadedness.
 
Patient is a former smoker who quit more than 40 years ago after smoking for 
approx. 15 years. Patient denies any alcohol or illicit drug usage. Patient 
states she takes OTC vitamins but denies taking any OTC pain medications/NSAIDS.
 
 
Patient follows Dr. Grijalva (GI).
 
Allergies/Medications
Allergies:
Coded Allergies:
No Known Allergies (08/20/18)
 
Home Med list
Apixaban (Eliquis) 5 MG TABLET   1 TAB PO BID PE
     .
Cholecalciferol (Vitamin D3) (Vitamin D) 1,000 UNIT TABLET   1 TAB PO DAILY 
SUPPLEMENT  (Reported)
Cyanocobalamin (Vitamin B-12) 1,000 MCG TABLET   1 TAB PO EOD SUPPLEMENT  (
Reported)
Mesalamine (Delzicol) 400 MG CAPSULE.   4 TAB PO TID IBD
     .
Mesalamine W/Cleansing Wipes (Rowasa 4 Gm/60 Ml Enema Kit) 4 GRAM/60 ML 
ENEMA.KIT   4 GM LA AT BEDTIME IBD
     .
Omeprazole Magnesium (Prilosec Otc) 20 MG TABLET.DR   1 TAB PO DAILY peptic 
ulcer disease  (Reported)
Paroxetine HCl 20 MG TABLET   1 TAB PO DAILY MENTAL HEALTH  (Reported)
Polyethylene Glycol 3350 (Miralax) 17 GRAM POWD.PACK   1 PAC PO DAILY 
constipation
     dissolve in water
Pravastatin Sodium 20 MG TABLET   1 TAB PO DAILY CHOLESTEROL  (Reported)
Prednisone 10 MG TABLET   1 TAB PO SEE INSTRUCT IBD
     TAKE 6/DAY FOR 7/19-7/25
     TAKE 5/DAY FOR 7/26-8/1
     TAKE 4/DAY UNTIL YOU SEE DR. GRIJALVA.
 
 
Past History
 
Travel History
Traveled to [place] past 21 day No
 
Medical History
Neurological: NONE
EENT: NONE
Cardiovascular: hyperlipidemia, HYPOTENSION
Respiratory: NONE
Gastrointestinal: GERD, "colitis" 40years ago. No treatment
Hepatic: NONE
Renal: NONE
Musculoskeletal: NONE
Psychiatric: depression
Endocrine: NONE
Blood Disorders: NONE
Cancer(s): NONE
GYN/Reproductive: NONE
History of MRSA: No
History of VRE: No
History of CDIFF: No
 
Surgical History
Surgical History: appendectomy, TONSILLECTOMY
 
Past Family/Social History
 
Family History
Relations & Conditions if any
MOTHER
  FH: ovarian cancer
SISTER
  FH: lung cancer
SISTER
  FH: breast cancer
aunt
  FH: pancreatic cancer
 
 
Functional Ability
ADLs
Independent: dressing, eating, toileting, bathing. 
Ambulation: independent
IADLs
Independent: shopping, housework, finances, food prep, telephone, transportation
, medication admin. 
 
Review of Systems
 
Review of Systems
Constitutional:
Reports: malaise, weakness.  Denies: chills, fever. 
Cardiovascular:
Reports: edema.  Denies: chest pain, palpitations. 
Respiratory:
Denies: cough, orthopnea, short of breath, sputum production. 
GI:
Denies: abdominal pain, constipation, diarrhea, nausea, bloody stool, changes in
stool, vomiting. 
Musculoskeletal:
Denies: back pain, joint pain. 
Skin:
Denies: lesions, rash. 
Neurological/Psychological:
Denies: confusion, headache, numbness, tingling. 
Hematologic/Endocrine:
Denies: bruising. 
 
Exam & Diagnostic Data
Last 24 Hrs of Vital Signs/I&O
 Vital Signs
 
 
Date Time Temp Pulse Resp B/P B/P Pulse O2 O2 Flow FiO2
 
     Mean Ox Delivery Rate 
 
08/20 2028 98.0 91 20 98/53  98 Room Air  
 
08/20 1845 97.9 92 18 102/52  96 Room Air  
 
08/20 1830 97.9 84 18 101/52  97 Room Air  
 
08/20 1815 97.9 84 18 98/51  96 Room Air  
 
08/20 1802 97.6 76 18 154/76  95 Room Air  
 
08/20 1758 98.3 87 18 103/57  98 Room Air  
 
08/20 1637 98.1 91 18 90/48  95 Room Air  
 
08/20 1542       Room Air  
 
08/20 1501 97.9 108 15 102/69  92 Room Air Room Air 
 
 
 Intake & Output
 
 
 08/20 1600 08/20 0800 08/20 0000
 
Intake Total 0  
 
Output Total   
 
Balance 0  
 
    
 
Intake, Oral 0  
 
Patient 121 lb  
 
Weight   
 
Weight Reported by Patient  
 
Measurement   
 
Method   
 
 
 
 
Physical Exam
General Appearance Alert, Oriented X3, Cooperative, No Acute Distress
Skin No Rashes, No Breakdown
HEENT Atraumatic
Neck Supple, No JVD, No LAD
Cardiovascular Regular Rate, Normal S1, Normal S2
Lungs Clear to Auscultation
Abdomen Soft, No Tenderness, No Hepatospenomegaly
Neurological Normal Speech, Normal Tone
Extremities B/L LOWER EXTREMITY NON-PITTING EDEMA 
 
Assessment/Plan
Assessment:
XRY-PORTABLE CHEST XRAY - 
There is mild bibasilar subsegmental atelectasis and platelike atelectasis
and/or scar visualized within the right upper lobe. No overt consolidative
disease or effusion.
 
ECHO 7/12 EF >60%
 
76 YO female with a PMHx. of PE s/p IVC filter, HLD, GERD, Depression, and 
Ulcerative colitis who lasts was at [place] 7/12 -7/18 who today presents to the
ED c/o "weakness" for the past two days.
 
Labs on admission: 
Hgb 6.5, HCT 20.3, WBC 7.1, PLT Count 822
BUN 23 Cr 0.6 
Total Protein 5.5 Alb 2.7
 
BP 90/48 on admit, rechecked 103/57; BP last 98/53
 
Etiology in this case with a presentation of weakness x 2 days presenting with 
labile blood pressures without any pallor, tachycardia, and dyspnea with low H/H
in a patient with history of Ulcerative Colitis and Acute Pulmonary embolism (7/
13) is likely related to acute blood anemia secondary to GI losses.
 
#Acute blood loss anemia, possibly GI source, pending further evaluation
-Admitted to general medicine floor for observation and management of vitals, 
-H/H low; crossmatched and typed; CBC every 8 hourly, transfused 2 units 
initially
-Continue regular diet for now, keep nothing by mouth past midnight.
-GI consulted
-Held Eliquis.
-Iron studies.
-IV Protonix 40 mg
-NPO after midnight in anticipation if need to do GI study tmrw; recent EGD + 
Flex sigmoidoscopy performed on 7/12
 
#Hypotension
-Check orthostatic vitals
-Likely secondary to acute blood anemia
 
#Home medications
-Continue home medications as required
-Mesalamine 1600 mg TID ([facility] management)
-Paroxetine 20mg, Pravastatin 20mg
 
DVT PPx.
Code: FC
 
As Ranked By This Provider
Problem List:
 1. Acute blood loss anemia
 
 2. Hypotension
 
 
Core Measures/Misc (9/17)
 
Acute Coronary Syndrome
ACS Diagnosis: No
 
Congestive Heart Failure
Congestive Heart Failure Diagnosis No
 
Cerebrovascular Accident
CVA/TIA Diagnosis: No
 
VTE (View Protocol)
VTE Risk Factors Acute Medical Illness
No Mechanical VTE Prophylaxis d/t N/A MechProphylax Ordered
No VTE Pharm Prophylaxis d/t NA PharmProphylax ordered
 
Sepsis (View protocol)
Sepsis Present: No
If YES complete Sepsis Event Note If YES complete Sepsis Event Note
 
Dave Lujan 08/20/18 5270:
General Information and HPI
MD Statement:
I have seen and personally examined DI SMITH and documented this H&P.
 
The patient is a 77 year old F who presented with a patient stated chief 
complaint of [acute blood loss].
 
Source of Information: patient
 
 
Core Measures/Misc (9/17)
 
Sepsis (View protocol)
If YES complete Sepsis Event Note If YES complete Sepsis Event Note
 
Resident Review Statement
Resident Statement: examined this patient, discussed with intern, agreed with 
intern
Other Findings:
This is a 77-year-old female with past medical history of hyperlipidemia, peptic
ulcer disease status post EGD negative for H pylori, former smoker, 
diverticulosis, admitted in June 2018 for left-sided presumably ischemic colitis
, remote history of colitis, incidental finding of lung nodules, depression 
previous was sent to [place] ER after her outpatient GI doctor Dr. Grijalva 
found her to be acutely anemic with significant drop in her H/H.She didnot have 
any major complaints, however after thinkign retrospectively, she thought that 
she was feeling very weak and tired for past 2 days.
 
She denied any nausea, vomting, dirreha, bloody stools, any blood loss, cold, 
cough, sore throat.
She has been takign her medicatinos includign eliqus regualryl.
 
 
More recent past - EGD done in July 2018 showed nonobstructing Schatzki's ring, 
nodular and polypoid gastric fundus and proximal gastric body and patulous 
esophagogastric junction with gastric and esophageal biopsies done at that time.
 
Flex sig found narrowing of the colonic lumen with beefy, erythematous nodular 
mucosa extending from proximal rectum, which was more consistent with nonhealing
ischemic colitis with biopsies taken, she wasstarted on Flagyl and Cipro, biopsy
was consistent with inflammatory bowel disease without dysplasia, cancer or 
granulomatous changes, and IV steroids along with methicillin mine p.o. and LA 
were started and continued upon discharge.
 
Other imaging studies done during previous admission showed a patent celiac, SMA
and FRANCES, however she was found to have a basal right lower lung PE on CTA, with 
Doppler showing partially occlusive thrombus within the proximal right femoral 
vein and therefore an IVC filter was placed by IR and she was discharged on 
Eliquis.
 
Her vitals on presentation her temperature of 97.6, pulse of 76, respiration of 
18, blood pressure 154/76, she was saturating 95% on room air.
 
Relevant labs showed a white count of 7.1, H/H of 6.5/20.3 (baseline H/H 10.0) 
10.0>>7.3>>6.5 today, platelet of 822, MCV of 90.1.
 
Sodium 132, potassium of 4.7, BUN/creatinine 23/0.6, glucose of 173.
Ferritin 26.0, AST mildly elevated to 44 otherwise liver function test negative,
troponin I negative.
Low albumin at 2.7.
UA negative.
PT 18.1, INR 1.65.
Chest x-ray showed mild bibasilar subsegmental atelectasis, no overt 
consolidation or effusion.
 
ProblemList along with assessment and plan.
Problem #1 acute blood loss anemia.
Admit to general medicine floor, continue to monitor vitals, intake and output, 
crossmatch and type, CBC every 8 hourly, transfuse 2 units now, consent 
obtained.
Continue regular diet for now, keep nothing by mouth past midnight.
GI consult.
Hold Eliquis.
Iron studies.
ct iv protonix
NPO after midnight in anticiaptino if need to do any procedure tmrw.
check orthostatic vitals.
 
 
ct other home meds - paroxitine, statin, mesalamine
 
FC
DVT px with ALPS
hold eliquis
PP
 
 
 
 
 
 
 
Popeye BARRIOS,Se 08/20/18 1953:
Core Measures/Misc (9/17)
 
Sepsis (View protocol)
If YES complete Sepsis Event Note If YES complete Sepsis Event Note
 
Attending MD Review Statement
 
Attending Statement
Attending MD Statement: examined this patient, discuss w/resident/PA/NP, agreed 
w/resident/PA/NP
Attending Assessment/Plan:
Patient is seen and examined independently by me. Care plan discussed with 
medical intern and resident. I agree with the physical exam findings and plan of
care as outlined above with the following changes and additions.
 
78 yo F history of HLD, GERD, PUD with negative H. pylori, remote history of 
colitis 40 years ago, recently admitted 7/12-7/18/18 for bloody stool and 
colitis. Initial concern of ischemic bowel due to colonoscopy showed appearance 
of non healing ischemic colitis. EGD shows non obstructing Schatzki ring. 
However, later pathology of biopsy showed probably IBD and treated with steroid 
and mesalamine. She was also found to have PE/DVT and discharged on Eliquis. 
Patient is presented with from Dr. Hanson for low H/H and weakness. Over the 
last 2 days, she has progressive worsening of generalized weakness and easy 
fatigue. She also has HERNANDEZ. Her  noticed she is pale. She denies chest 
pain, dizziness, lightheadedness, SOB at rest, abdominal pain, dark color stool 
or bloody stool. Outpatient Hb 7.3 today. (10.0 on 7/18/18) 
On exam, heart: regular, S1S2. Lungs: clear bilaterally. Abdomen: soft, bowel 
sound present, NTND. Guaiac negative stool as per ED. 
In the ED, H/H 6.5/20.3. INR 1.65. BUN 23, Cr 0.6. 
CXR shows no acute consolidation or effusion. 
EKG shows NSR at 89 with no acute ST-T changes when compares with previous EKG. 
She got Protonix 40 mg and NS 1 L IV in the ED. Patient is going to have 2 units
PRBC transfusion and first unit transfusion started in the ED. 
Patient is admitted to [facility] for symptomatic anemia with concern of slow 
intermittent GI bleed. 
Agree with PRBC transfusion and follow post transfusion serial H/H. Guaiac all 
stools. Check iron studies. 
NPO. IV hydration. 
Hold Eliquis for now. 
IV protonix.
GI consult.
 
Se Nye MD FACP

## 2018-08-21 VITALS — SYSTOLIC BLOOD PRESSURE: 100 MMHG | DIASTOLIC BLOOD PRESSURE: 70 MMHG

## 2018-08-21 VITALS — SYSTOLIC BLOOD PRESSURE: 100 MMHG | DIASTOLIC BLOOD PRESSURE: 66 MMHG

## 2018-08-21 VITALS — SYSTOLIC BLOOD PRESSURE: 100 MMHG | DIASTOLIC BLOOD PRESSURE: 60 MMHG

## 2018-08-21 LAB
ABSOLUTE GRANULOCYTE CT: 3.9 /CUMM (ref 1.4–6.5)
ABSOLUTE GRANULOCYTE CT: 4.1 /CUMM (ref 1.4–6.5)
BASOPHILS # BLD: 0 /CUMM (ref 0–0.2)
BASOPHILS # BLD: 0 /CUMM (ref 0–0.2)
BASOPHILS NFR BLD: 0.3 % (ref 0–2)
BASOPHILS NFR BLD: 0.4 % (ref 0–2)
EOSINOPHIL # BLD: 0.1 /CUMM (ref 0–0.7)
EOSINOPHIL # BLD: 0.2 /CUMM (ref 0–0.7)
EOSINOPHIL NFR BLD: 2 % (ref 0–5)
EOSINOPHIL NFR BLD: 3 % (ref 0–5)
ERYTHROCYTE [DISTWIDTH] IN BLOOD BY AUTOMATED COUNT: 16 % (ref 11.5–14.5)
ERYTHROCYTE [DISTWIDTH] IN BLOOD BY AUTOMATED COUNT: 16.7 % (ref 11.5–14.5)
GRANULOCYTES NFR BLD: 65 % (ref 42.2–75.2)
GRANULOCYTES NFR BLD: 66.7 % (ref 42.2–75.2)
HCT VFR BLD CALC: 26.3 % (ref 37–47)
HCT VFR BLD CALC: 30 % (ref 37–47)
LYMPHOCYTES # BLD: 1.5 /CUMM (ref 1.2–3.4)
LYMPHOCYTES # BLD: 1.5 /CUMM (ref 1.2–3.4)
MCH RBC QN AUTO: 29.7 PG (ref 27–31)
MCH RBC QN AUTO: 29.7 PG (ref 27–31)
MCHC RBC AUTO-ENTMCNC: 32.9 G/DL (ref 33–37)
MCHC RBC AUTO-ENTMCNC: 33.2 G/DL (ref 33–37)
MCV RBC AUTO: 89.3 FL (ref 81–99)
MCV RBC AUTO: 90.3 FL (ref 81–99)
MONOCYTES # BLD: 0.4 /CUMM (ref 0.1–0.6)
MONOCYTES # BLD: 0.5 /CUMM (ref 0.1–0.6)
PLATELET # BLD: 581 /CUMM (ref 130–400)
PLATELET # BLD: 637 /CUMM (ref 130–400)
PMV BLD AUTO: 7.2 FL (ref 7.4–10.4)
PMV BLD AUTO: 7.5 FL (ref 7.4–10.4)
RED BLOOD CELL CT: 2.95 /CUMM (ref 4.2–5.4)
RED BLOOD CELL CT: 3.32 /CUMM (ref 4.2–5.4)
WBC # BLD AUTO: 6.1 /CUMM (ref 4.8–10.8)
WBC # BLD AUTO: 6.2 /CUMM (ref 4.8–10.8)

## 2018-08-21 NOTE — CONS- GASTROENTEROLOGY
General Information and HPI
 
Consulting Request
Date of Consult: 08/21/18
Requested By:
Rajendra Castañeda MD
 
Reason for Consult:
1.  Anemia
2.  Inflammatory bowel disease
Source of Information: patient
Exam Limitations: no limitations
History of Present Illness:
Patient is a 77-year-old female well-known to me.  Patient was recently admitted
after I performed a colonoscopy and found severe colitis involving the sigmoid 
and distal descending colon and reaching beyond.  Biopsies ultimately revealed 
inflammatory bowel disease.  Since there was some element of rectal sparing I am
unsure whether she has ulcerative colitis or Crohn's colitis.  Patient has been 
doing very well on therapy and has recovered nicely since discharge from 
hospital.  I seen her in the office for follow-up and had ordered a routine C 
BC.  She has been taking Eliquis for a DVT with PE.
 
I have been called for a critical value, hemoglobin of 7.1.  I sent Ms. Perez
to the ED for blood transfusion.  Upon arrival in the ED her heart rate was 108 
although she was feeling well.  And her hemoglobin had drifted down to 6.9.  
Therefore she was admitted.  She has had no melena no bright red blood per 
rectum and denies nausea vomiting or abdominal pain.  She is feeling better than
she had in quite some time.
 
Allergies/Medications
Allergies:
Coded Allergies:
No Known Allergies (08/20/18)
 
Home Med List:
Apixaban (Eliquis) 5 MG TABLET   1 TAB PO BID PE
     .
Cholecalciferol (Vitamin D3) (Vitamin D) 1,000 UNIT TABLET   1 TAB PO DAILY 
SUPPLEMENT  (Reported)
Cyanocobalamin (Vitamin B-12) 1,000 MCG TABLET   1 TAB PO EOD SUPPLEMENT  (
Reported)
Mesalamine (Delzicol) 400 MG CAPSULE.   4 TAB PO TID IBD
     .
Mesalamine W/Cleansing Wipes (Rowasa 4 Gm/60 Ml Enema Kit) 4 GRAM/60 ML 
ENEMA.KIT   4 GM FL AT BEDTIME IBD
     .
Omeprazole Magnesium (Prilosec Otc) 20 MG TABLET.DR   1 TAB PO DAILY peptic 
ulcer disease  (Reported)
Paroxetine HCl 20 MG TABLET   1 TAB PO DAILY MENTAL HEALTH  (Reported)
Polyethylene Glycol 3350 (Miralax) 17 GRAM POWD.PACK   1 PAC PO DAILY 
constipation
     dissolve in water
Pravastatin Sodium 20 MG TABLET   1 TAB PO DAILY CHOLESTEROL  (Reported)
 
Current Medications:
 Current Medications
 
 
  Sig/Maria E Start time  Last
 
Medication Dose Route Stop Time Status Admin
 
Apixaban 5 MG BID 08/21 2100 AC 
 
  PO   
 
Cholecalciferol 1,000 IU DAILY 08/20 1922 AC 08/21
 
  PO   1418
 
Cyanocobalamin 1,000 MCG Q48H 08/20 1930 AC 08/20
 
  PO   2102
 
Mesalamine 1,600 MG TID 08/20 2100 AC 08/21
 
  PO   1419
 
Pantoprazole Sodium 40 MG DAILY 08/21 1815 AC 08/21
 
  IV   1806
 
Paroxetine HCl 20 MG DAILY 08/21 0900 AC 08/21
 
  PO   1419
 
Patient Medication  1 ED ONE ONE 08/21 1415 DC 08/21
 
Teaching  ED 08/21 1416  1806
 
Pravastatin Sodium 20 MG DAILY 08/20 1923 AC 08/21
 
  PO   1418
 
Sodium Chloride 1,000 ML .Q10H 08/20 1815 DC 08/20
 
  IV 08/21 0414  1927
 
 
 
 
Past History
 
Travel History
Traveled to [place] past 21 day No
 
Medical History
Blood Transfusion Hx: Yes
Neurological: NONE
EENT: NONE
Cardiovascular: hyperlipidemia, HYPOTENSION
Respiratory: NONE
Gastrointestinal: GERD, "colitis" 40years ago. No treatment
Hepatic: NONE
Renal: NONE
Musculoskeletal: NONE
Psychiatric: depression
Endocrine: NONE
Blood Disorders: NONE
Cancer(s): NONE
GYN/Reproductive: NONE
 
Surgical History
Surgical History: appendectomy, TONSILLECTOMY
 
Family History
Relations & Conditions If Any:
MOTHER
  FH: ovarian cancer
SISTER
  FH: lung cancer
SISTER
  FH: breast cancer
aunt
  FH: pancreatic cancer
 
 
Psychosocial History
Services at Home: None
Smoking Status: Former Smoker
 
Functional Ability
ADLs
Independent: dressing, eating, toileting, bathing. 
Ambulation: independent
IADLs
Independent: shopping, housework, finances, food prep, telephone, transportation
, medication admin. 
 
Review of Systems
 
Review of Systems
Constitutional:
Reports: no symptoms. 
EENTM:
Reports: no symptoms. 
Cardiovascular:
Reports: no symptoms. 
Respiratory:
Reports: no symptoms. 
GI:
Reports: no symptoms. 
Genitourinary:
Reports: no symptoms. 
Musculoskeletal:
Reports: no symptoms. 
Skin:
Reports: no symptoms. 
Neurological/Psychological:
Reports: no symptoms. 
Hematologic/Endocrine:
Reports: no symptoms. 
 
Exam & Diagnostic Data
Vital Signs and I&O
Vital Signs
 
 
Date Time Temp Pulse Resp B/P B/P Pulse O2 O2 Flow FiO2
 
     Mean Ox Delivery Rate 
 
08/21 1354 98.3 82 20 100/70  93 Room Air  
 
08/21 0657 97.6 86 20 100/66  99 Room Air  
 
08/21 0120 97.8 79 20 100/70     
 
08/20 2247 98.1 89 20 100/54  95 Room Air  
 
08/20 2202      98 Room Air  
 
08/20 2028 98.0 91 20 98/53  98 Room Air  
 
 
 Intake & Output
 
 
 08/21 1600 08/21 0400 08/20 1600 08/20 0400 08/19 1600 08/19 0400
 
Intake Total 800 240 0   
 
Output Total      
 
Balance 800 240 0   
 
       
 
Intake,      
 
Intake, Oral 0 240 0   
 
Patient  121 lb 121 lb   
 
Weight      
 
Weight  Reported by Patient Reported by Patient   
 
Measurement      
 
Method      
 
 
 
 
Physical Exam
General Appearance: no apparent distress, alert, awake
Head: atraumatic, normal appearance, active bleeding
Eyes:
Bilateral: normal appearance. 
Ears, Nose, Throat: hearing grossly normal
Neck: normal inspection, supple, full range of motion
Respiratory: normal breath sounds, lungs clear
Cardiovascular: regular rate/rhythm
Gastrointestinal: normal bowel sounds, soft, non-tender, no organomegaly
Back: normal inspection
Extremities: normal inspection
Neurologic/Psych: awake, alert, oriented x 3
Cranial Nerves: normal hearing, normal speech
Skin: intact, normal color, warm/dry
 
Results
Pertinent Lab Results:
 Laboratory Tests
 
 
 08/21 08/21
 
 0919 0210
 
Chemistry  
 
  Sodium (137 - 145 mmol/L) 136  L 
 
  Potassium (3.5 - 5.1 mmol/L) 4.2 
 
  Chloride (98 - 107 mmol/L) 106 
 
  Carbon Dioxide (22 - 30 mmol/L) 26 
 
  Anion Gap (5 - 16) 4  L 
 
  BUN (7 - 17 mg/dL) 15 
 
  Creatinine (0.5 - 1.0 mg/dL) 0.5 
 
  Estimated GFR (>60 ml/min) > 60 
 
  BUN/Creatinine Ratio (7 - 25 %) 30.0  H 
 
Hematology  
 
  CBC w Diff NO MAN DIFF REQ NO MAN DIFF REQ
 
  WBC (4.8 - 10.8 /CUMM) 6.2 6.1
 
  RBC (4.20 - 5.40 /CUMM) 3.32  L 2.95  L
 
  Hgb (12.0 - 16.0 G/DL) 9.9  L 8.7  L
 
  Hct (37 - 47 %) 30.0  L 26.3  L
 
  MCV (81.0 - 99.0 FL) 90.3 89.3
 
  MCH (27.0 - 31.0 PG) 29.7 29.7
 
  MCHC (33.0 - 37.0 G/DL) 32.9  L 33.2
 
  RDW (11.5 - 14.5 %) 16.7  H 16.0  H
 
  Plt Count (130 - 400 /CUMM) 637  H 581  H
 
  MPV (7.4 - 10.4 FL) 7.5 7.2  L
 
  Gran % (42.2 - 75.2 %) 66.7 65.0
 
  Lymphocytes % (20.5 - 51.1 %) 23.7 24.4
 
  Monocytes % (1.7 - 9.3 %) 6.3 8.2
 
  Eosinophils % (0 - 5 %) 3.0 2.0
 
  Basophils % (0.0 - 2.0 %) 0.3 0.4
 
  Absolute Granulocytes (1.4 - 6.5 /CUMM) 4.1 3.9
 
  Absolute Lymphocytes (1.2 - 3.4 /CUMM) 1.5 1.5
 
  Absolute Monocytes (0.10 - 0.60 /CUMM) 0.4 0.5
 
  Absolute Eosinophils (0.0 - 0.7 /CUMM) 0.2 0.1
 
  Absolute Basophils (0.0 - 0.2 /CUMM) 0 0
 
 
 
 
 08/20 08/20
 
 1910 1755
 
Hematology  
 
  CBC w Diff Cancelled 
 
  WBC Cancelled 
 
  RBC Cancelled 
 
  Hgb Cancelled 
 
  Hct Cancelled 
 
  MCV Cancelled 
 
  MCH Cancelled 
 
  MCHC Cancelled 
 
  RDW Cancelled 
 
  Plt Count Cancelled 
 
  MPV Cancelled 
 
Urines  
 
  Urine Color (YEL,AMB,STR)  YEL
 
  Urine Clarity (CLEAR)  CLEAR
 
  Urine pH (5.0 - 8.0)  6.5
 
  Ur Specific Gravity (1.001 - 1.035)  1.010
 
  Urine Protein (NEG,<30 MG/DL)  NEG
 
  Urine Ketones (NEG)  NEG
 
  Urine Nitrite (NEG)  NEG
 
  Urine Bilirubin (NEG)  NEG
 
  Urine Urobilinogen (0.1  -  1.0 EU/dl)  0.2
 
  Ur Leukocyte Esterase (NEG)  NEG
 
  Ur Microscopic  EXAM NOT REQUIRED
 
  Urine Hemoglobin (NEG)  NEG
 
  Urine Glucose (N MG/DL)  NEG
 
 
 
 
 08/20
 
 1529
 
Chemistry 
 
  Sodium (137 - 145 mmol/L) 132  L
 
  Potassium (3.5 - 5.1 mmol/L) 4.7
 
  Chloride (98 - 107 mmol/L) 98
 
  Carbon Dioxide (22 - 30 mmol/L) 27
 
  Anion Gap (5 - 16) 7
 
  BUN (7 - 17 mg/dL) 23  H
 
  Creatinine (0.5 - 1.0 mg/dL) 0.6
 
  Estimated GFR (>60 ml/min) > 60
 
  BUN/Creatinine Ratio (7 - 25 %) 38.3  H
 
  Glucose (65 - 99 mg/dL) 173  H
 
  Calcium (8.4 - 10.2 mg/dL) 8.7
 
  Ferritin (11.1 - 264 ng/mL) 26.0
 
  Total Bilirubin (0.2 - 1.3 mg/dL) 0.4
 
  AST (14 - 36 U/L) 44  H
 
  ALT (9 - 52 U/L) 38
 
  Alkaline Phosphatase (<127 U/L) 93
 
  Troponin I (< 0.11 ng/ml) < 0.01
 
  Total Protein (6.3 - 8.2 g/dL) 5.5  L
 
  Albumin (3.5 - 5.0 g/dL) 2.7  L
 
  Globulin (1.9 - 4.2 gm/dL) 2.8
 
  Albumin/Globulin Ratio (1.1 - 2.2 %) 1.0  L
 
Coagulation 
 
  PT (9.4 - 12.5 SEC) 18.1  H
 
  INR (0.90 - 1.19) 1.65  H
 
  APTT (25 - 37 SEC) 30
 
Hematology 
 
  CBC w Diff NO MAN DIFF REQ
 
  WBC (4.8 - 10.8 /CUMM) 7.1
 
  RBC (4.20 - 5.40 /CUMM) 2.25  L
 
  Hgb (12.0 - 16.0 G/DL) 6.5 *L
 
  Hct (37 - 47 %) 20.3  L
 
  MCV (81.0 - 99.0 FL) 90.1
 
  MCH (27.0 - 31.0 PG) 29.0
 
  MCHC (33.0 - 37.0 G/DL) 32.2  L
 
  RDW (11.5 - 14.5 %) 18.7  H
 
  Plt Count (130 - 400 /CUMM) 822  H
 
  MPV (7.4 - 10.4 FL) 7.3  L
 
  Gran % (42.2 - 75.2 %) 68.2
 
  Lymphocytes % (20.5 - 51.1 %) 24.9
 
  Monocytes % (1.7 - 9.3 %) 5.3
 
  Eosinophils % (0 - 5 %) 1.1
 
  Basophils % (0.0 - 2.0 %) 0.5
 
  Absolute Granulocytes (1.4 - 6.5 /CUMM) 4.8
 
  Absolute Lymphocytes (1.2 - 3.4 /CUMM) 1.8
 
  Absolute Monocytes (0.10 - 0.60 /CUMM) 0.4
 
  Absolute Eosinophils (0.0 - 0.7 /CUMM) 0.1
 
  Absolute Basophils (0.0 - 0.2 /CUMM) 0
 
 
 
 
Assessment/Plan
Assessment/Recommendations:
ASSESSMENT:
1.  Inflammatory bowel disease
2.  History of DVT with PE
3.  Anemia
4.  Chronic use of antithrombotic agent
 
Patient has had no gross GI blood loss.  I suspect that she had had some mild 
blood loss in the setting of healing inflammatory bowel disease and concurrent 
use of antithrombotic agents.  However currently she has no signs of active 
bleeding and is feeling well.
 
RECOMMENDATIONS:
1.  Transfuse as you have done
2.  If H&H stable discharge in a.m.
 
 
Copies To:
Sebastien BARRIOS,Reggie Nguyen; Nani BARRIOS,Scott IBARRA.
 
Consult Acknowledgment
- Thank you for your consult request.

## 2018-08-21 NOTE — PATIENT DISCHARGE INSTRUCTIONS
Discharge Instructions
 
General Discharge Information
You were seen/treated for:
Acutre blood loss anemia
You had these procedures:
Chest XRay 
 
Blood Transfusions
Watch for these problems:
If you experience any dizziness, lightheadedness, fatigue, fevers, and chills 
please follow up with your PCP.
Special Instructions:
Please follow up with PCP in one week.
 
Please follow up with Dr. Rosenthal (GI) and Dr. Cardoza (Pulmonary) within one 
week.
 
Please continue to take home medications as prescribed.
 
Diet
Continue normal diet: Yes
Recommended Diet: Regular
 
Activity
Full Activity/No Limits: No
Activity Self Limited: Yes
 
Acute Coronary Syndrome
 
Inclusion Criteria
At DC or during hospital stay patient has or had the following:
ACS DIAGNOSIS No
 
Discharge Core Measures
Meds if any: Prescribed or Continued at Discharge
Meds if any: NOT Prescribed or Continued at Discharge
 
Congestive Heart Failure
 
Inclusion Criteria
At DC or during hospital stay patient has or had the following:
CHF DIAGNOSIS No
 
Discharge Core Measures
Meds if any: Prescribed or Continued at Discharge
Meds if any: NOT Prescribed or Continued at Discharge
 
Cerebrovascular accident
 
Inclusion Criteria
At DC or during hospital stay patient has or had the following:
CVA/TIA Diagnosis No
 
Discharge Core Measures
Meds if any: Prescribed or Continued at Discharge
Meds if any: NOT Prescribed or Continued at Discharge
 
Venous thromboembolism
 
Inclusion Criteria
VTE Diagnosis No
VTE Type NONE
VTE Confirmed by (Test) NONE
 
Discharge Core Measures
- Per Current guidelines, there needs to be overlap
- treatment for the first 5 days of Warfarin therapy.
- If discharged on Warfarin prior to 5 days of
- overlap therapy, the patient will need to be
- assessed for post discharge needs including
- *Post discharge parental anticoagulation
- *Warfarin and/or parental anticoagulation education
- *Follow up date to check INR post discharge
At least 5 days overlap therapy as Inpatient No
Meds if any: Prescribed or Continued at Discharge
Note: Overlap Therapy is Warfarin and Anticoagulant
Meds if any: NOT Prescribed or Continued at Discharge

## 2018-08-21 NOTE — PN- HOUSESTAFF
**See Addendum**
Subjective
Follow-up For:
Acute blood loss anemia
Hypotension
Subjective:
Afebrile overnight. Patient is sleeping and awakens easily upon examination. 
Patient reports no weakness this morning and notes improved energy after 
receiving her 2 blood transfusions last night. Patient overall states she is 
feeling better today except for feeling a little cold this morning. Patient 
otherwise denies any diarrhea, constipation, fevers, chills, fatigue, chest pain
, and shortness of breath.
 
Review of Systems
Constitutional:
Reports: see HPI. 
 
Objective
Last 24 Hrs of Vital Signs/I&O
 Vital Signs
 
 
Date Time Temp Pulse Resp B/P B/P Pulse O2 O2 Flow FiO2
 
     Mean Ox Delivery Rate 
 
 1354 98.3 82 20 100/70  93 Room Air  
 
 0657 97.6 86 20 100/66  99 Room Air  
 
 0120 97.8 79 20 100/70     
 
 2247 98.1 89 20 100/54  95 Room Air  
 
 2202      98 Room Air  
 
 2028 98.0 91 20 98/53  98 Room Air  
 
 1845 97.9 92 18 102/52  96 Room Air  
 
 1830 97.9 84 18 101/52  97 Room Air  
 
 1815 97.9 84 18 98/51  96 Room Air  
 
 1802 97.6 76 18 154/76  95 Room Air  
 
 1758 98.3 87 18 103/57  98 Room Air  
 
 1637 98.1 91 18 90/48  95 Room Air  
 
 1542       Room Air  
 
 1501 97.9 108 15 102/69  92 Room Air Room Air 
 
 
 Intake & Output
 
 
  1600  0800  0000
 
Intake Total 0 800 240
 
Output Total   
 
Balance 0 800 240
 
    
 
Intake, IV  800 
 
Intake, Oral 0 0 240
 
Patient   121 lb
 
Weight   
 
Weight   Reported by Patient
 
Measurement   
 
Method   
 
 
 
 
Physical Exam
General Appearance: Alert, Oriented X3, Cooperative, No Acute Distress
Skin: No Rashes, No Breakdown
HEENT: Atraumatic
Neck: Supple, No JVD
Cardiovascular: Regular Rate, Normal S1, Normal S2
Lungs: Clear to Auscultation
Abdomen: Soft, No Tenderness
Neurological: Normal Speech
Extremities: No Edema, Normal Pulses
 
Assessment/Plan
Assessment:
XRY-PORTABLE CHEST XRAY - 
There is mild bibasilar subsegmental atelectasis and platelike atelectasis
and/or scar visualized within the right upper lobe. No overt consolidative
disease or effusion.
 
ECHO  EF >60%
 
76 YO female with a PMHx. of PE s/p IVC filter, HLD, GERD, Depression, and 
Ulcerative colitis who lasts was at [facility]  - who today presents to the
ED c/o "weakness" for the past two days.
 
Labs on admission: 
Hgb 6.5, HCT 20.3, WBC 7.1, PLT Count 822
BUN 23 Cr 0.6 
Total Protein 5.5 Alb 2.7
 
BP 90/48 on admit, rechecked 103/57; BP last 100/70
 
Etiology in this case with a presentation of weakness x 2 days presenting with 
labile blood pressures without any pallor, tachycardia, and dyspnea with low H/H
in a patient with history of Ulcerative Colitis and Acute Pulmonary embolism () is likely related to acute blood anemia secondary to GI losses.
 
#Acute blood loss anemia, possibly GI source, pending further evaluation
-Admitted to general medicine floor for observation and management of vitals, 
-H/H low; crossmatched and typed; CBC every 8 hourly, transfused 2 units 
initially; follow up H/H 9.9/30.0
-Continue regular diet for now, keep nothing by mouth past midnight.
-GI consulted
-Held Eliquis.
-Iron studies.
-IV Protonix 40 mg
-NPO after midnight in anticipation if need to do GI study tmrw; recent EGD + 
Flex sigmoidoscopy performed on ; GI recommend to follow H/H and if stable 
ready for discharge
 
#Hypotension
-CheckED orthostatic vitals
-Likely secondary to acute blood anemia
 
#Home medications
-Continue home medications as required
-Mesalamine 1600 mg TID ([facility])
-Paroxetine 20mg, Pravastatin 20mg
 
DVT PPx.
Code: FC
Problem List:
 1. Acute blood loss anemia
 
 2. Hypotension
 
Pain Ratin
Pain Location:
na
Pain Goal: Remain pain free
Pain Plan:
na
Tomorrow's Labs & Rationales:
routine

## 2018-08-22 VITALS — DIASTOLIC BLOOD PRESSURE: 60 MMHG | SYSTOLIC BLOOD PRESSURE: 90 MMHG

## 2018-08-22 LAB
ABSOLUTE GRANULOCYTE CT: 3.8 /CUMM (ref 1.4–6.5)
BASOPHILS # BLD: 0 /CUMM (ref 0–0.2)
BASOPHILS NFR BLD: 0.6 % (ref 0–2)
EOSINOPHIL # BLD: 0.1 /CUMM (ref 0–0.7)
EOSINOPHIL NFR BLD: 1.8 % (ref 0–5)
ERYTHROCYTE [DISTWIDTH] IN BLOOD BY AUTOMATED COUNT: 16.7 % (ref 11.5–14.5)
GRANULOCYTES NFR BLD: 58 % (ref 42.2–75.2)
HCT VFR BLD CALC: 30.7 % (ref 37–47)
LYMPHOCYTES # BLD: 2.1 /CUMM (ref 1.2–3.4)
MCH RBC QN AUTO: 29.4 PG (ref 27–31)
MCHC RBC AUTO-ENTMCNC: 33 G/DL (ref 33–37)
MCV RBC AUTO: 89.1 FL (ref 81–99)
MONOCYTES # BLD: 0.5 /CUMM (ref 0.1–0.6)
PLATELET # BLD: 821 /CUMM (ref 130–400)
PMV BLD AUTO: 7 FL (ref 7.4–10.4)
RED BLOOD CELL CT: 3.45 /CUMM (ref 4.2–5.4)
WBC # BLD AUTO: 6.5 /CUMM (ref 4.8–10.8)

## 2018-08-22 NOTE — PN- HOUSESTAFF
**See Addendum**
Subjective
Follow-up For:
Acute blood loss anemia
Hypotension
Subjective:
Afebrile overnight. No acute events overnight. Patient is seen and examined in 
bed. Patient states this morning that she feels ready and waiting on her blood 
levels to see if she is ready to go home. Patient slept well overall and has 
also been ambulating well. Patient otherwise denies any n/v, diarrhea, 
constipation, fevers, chills, fatigue, chest pain, and shortness of breath.
 
Review of Systems
Constitutional:
Reports: see HPI. 
 
Objective
Last 24 Hrs of Vital Signs/I&O
 Vital Signs
 
 
Date Time Temp Pulse Resp B/P B/P Pulse O2 O2 Flow FiO2
 
     Mean Ox Delivery Rate 
 
 0553 97.6 76 20 90/60  93 Room Air  
 
 2223 98.2 90 20 100/60  92   
 
 1600       Room Air  
 
 1354 98.3 82 20 100/70  93 Room Air  
 
 
 
 
Physical Exam
General Appearance: Alert, Oriented X3, Cooperative, No Acute Distress
Skin: No Rashes, No Breakdown
HEENT: Atraumatic
Neck: Supple, No JVD
Cardiovascular: Regular Rate, Normal S1, Normal S2
Lungs: Clear to Auscultation
Abdomen: Soft, No Tenderness
Neurological: Normal Gait, Normal Speech
Extremities: No Edema, Normal Pulses
 
Assessment/Plan
Assessment:
XRY-PORTABLE CHEST XRAY - 
There is mild bibasilar subsegmental atelectasis and platelike atelectasis
and/or scar visualized within the right upper lobe. No overt consolidative
disease or effusion.
 
ECHO  EF >60%
 
76 YO female with a PMHx. of PE s/p IVC filter, HLD, GERD, Depression, and 
Ulcerative colitis who lasts was at [place]  - who today presents to the
ED c/o "weakness" for the past two days.
 
Labs on admission: 
Hgb 6.5, HCT 20.3, WBC 7.1, PLT Count 822
BUN 23 Cr 0.6 
Total Protein 5.5 Alb 2.7
 
BP 90/48 on admit, rechecked 103/57; BP last 100/70
 
Etiology in this case with a presentation of weakness x 2 days presenting with 
labile blood pressures without any pallor, tachycardia, and dyspnea with low H/H
in a patient with history of Ulcerative Colitis and Acute Pulmonary embolism () is likely related to acute blood anemia secondary to GI losses.
 
#Acute blood loss anemia, possibly GI source, pending further evaluation
-Admitted to general medicine floor for observation and management of vitals, 
-H/H low; crossmatched and typed; CBC every 8 hourly, transfused 2 units 
initially; follow up H/H 9.930.0
-Continue regular diet
-GI consulted
-Held Eliquis; restarted Eliquis
-Iron studies.
-IV Protonix 40 mg
-NPO after midnight in anticipation if need to do GI study tmrw; recent EGD + 
Flex sigmoidoscopy performed on ; GI recommend to follow H/H and if stable 
ready for discharge; H/H today 10..7, improvement from yesterday, stable for
discharge & discharge order placed
 
#Hypotension
-CheckED orthostatic vitals
-Likely secondary to acute blood anemia
 
#Home medications
-Continue home medications as required
-Mesalamine 1600 mg TID ([facility])
-Paroxetine 20mg, Pravastatin 20mg
 
DVT PPx.
Code: FC
Problem List:
 1. Acute blood loss anemia
 
 2. Hypotension
 
Pain Ratin
Pain Location:
na
Pain Goal: Remain pain free
Pain Plan:
na
Tomorrow's Labs & Rationales:
routine